# Patient Record
Sex: FEMALE | Race: WHITE | HISPANIC OR LATINO
[De-identification: names, ages, dates, MRNs, and addresses within clinical notes are randomized per-mention and may not be internally consistent; named-entity substitution may affect disease eponyms.]

---

## 2017-02-08 ENCOUNTER — RX ONLY (OUTPATIENT)
Age: 32
Setting detail: RX ONLY
End: 2017-02-08

## 2017-02-08 PROBLEM — D22.5 MELANOCYTIC NEVI OF TRUNK: Status: ACTIVE | Noted: 2017-02-08

## 2017-12-29 ENCOUNTER — GYNECOLOGY VISIT (OUTPATIENT)
Dept: OBGYN | Facility: CLINIC | Age: 32
End: 2017-12-29
Payer: OTHER GOVERNMENT

## 2017-12-29 VITALS
BODY MASS INDEX: 44.37 KG/M2 | HEIGHT: 61 IN | SYSTOLIC BLOOD PRESSURE: 122 MMHG | WEIGHT: 235 LBS | DIASTOLIC BLOOD PRESSURE: 82 MMHG

## 2017-12-29 DIAGNOSIS — D25.1 INTRAMURAL LEIOMYOMA OF UTERUS: ICD-10-CM

## 2017-12-29 DIAGNOSIS — N92.0 MENORRHAGIA WITH REGULAR CYCLE: ICD-10-CM

## 2017-12-29 PROCEDURE — 99214 OFFICE O/P EST MOD 30 MIN: CPT | Performed by: OBSTETRICS & GYNECOLOGY

## 2017-12-29 NOTE — PROGRESS NOTES
32 y.o. , Prev C/s x1, for post dates , CPD . Patient as well with massive uterine fibroid . Patient's op note states 25 cm . And delivery was .   Patient now returns and concerned about not be able to concieve. Had follow up scan  , told fibroid was > 17cm Fundal .describes normal monthly cycles , heavy , with clots , although less now , than prior . Patient wants advice on tumor and pregnancy .   Past Medical History:   Diagnosis Date   • Pneumonia     Scar tissue remains, activity induced breathing problems, uses a daily and rescue, haven't used for over a year     Patient Active Problem List    Diagnosis Date Noted   • Intramural leiomyoma of uterus 2015     Social History     Social History   • Marital status:      Spouse name: N/A   • Number of children: N/A   • Years of education: N/A     Occupational History   • Not on file.     Social History Main Topics   • Smoking status: Former Smoker     Packs/day: 0.12     Types: Cigarettes   • Smokeless tobacco: Former User     Quit date: 2002   • Alcohol use No   • Drug use: No   • Sexual activity: Not on file     Other Topics Concern   • Not on file     Social History Narrative   • No narrative on file        Past Surgical History:   Procedure Laterality Date   • PRIMARY C SECTION  2015    Procedure: PRIMARY C SECTION;  Surgeon: Lorena Flores M.D.;  Location: [facility];  Service:    • OTHER      Surgery for Right arm fracture at 18y/o     Obstetric History       T0      L1     SAB1   TAB0   Ectopic0   Molar0   Multiple0   Live Births1      Current Outpatient Prescriptions on File Prior to Visit   Medication Sig Dispense Refill   • oxycodone-acetaminophen (PERCOCET) 5-325 MG Tab Take 1-2 Tabs by mouth every four hours as needed for Moderate Pain ((Pain Scale 4-6)). 30 Tab 0   • ibuprofen (MOTRIN) 600 MG Tab Take 1 Tab by mouth every 6 hours as needed (Cramping). 30 Tab 3   • ferrous sulfate 325  "(65 FE) MG tablet Take 1 Tab by mouth 3 times a day, with meals. 90 Tab 3   • docusate sodium 100 MG Cap Take 100 mg by mouth 2 times a day as needed for Constipation. 60 Cap 3   • Prenatal Vit w/Wt-Nsckdnyjp-QK (PNV PO) Take 1 Tab by mouth every day.     • amoxicillin (AMOXIL) 875 MG tablet Take 1 Tab by mouth 2 times a day. 20 Tab 0   • DPH-Lido-AlHydr-MgHydr-Simeth (FIRST-MOUTHWASH BLM) SUSP 10ml gargle and spit every 3 hours as needed. 240 mL 0   • therapeutic multivitamin-minerals (THERAGRAN-M) TABS Take 1 Tab by mouth every day.       No current facility-administered medications on file prior to visit.        Allergies   Allergen Reactions   • Erythromycin      U/s Reviewed :   /25/2016 3:22 PM    HISTORY/REASON FOR EXAM:  Follow-up fibroid      TECHNIQUE/EXAM DESCRIPTION:  Transvaginal pelvic ultrasound.    COMPARISON:   Pelvic ultrasound 11/7/2014    FINDINGS:    Imaging assessment is extremely limited due to patient body habitus.      Both transabdominal and transvaginal scanning was performed to optimally visualize the pelvis.    The uterus measures 15.38 cm x 20.46 cm x 16.93 cm.      Protruding off the uterine fundus there is an approximate 17 cm mass. This finding is consistent with a fibroid. It is considerably larger.      The endometrial echo complex measures 0.83 cm.      The right ovary is not visualized.    The left ovary measures 1.92 cm x 1.40 cm x 2.59 cm.        There is no free fluid seen.   Impression       1.  Large mass protruding off the uterine fundus measuring approximately 17 cm and considerably larger. This finding is most consistent with a fibroid.    2.  Left ovary grossly normal    3.  Right ovary not visualized    4.  Imaging assessment is extremely limited due to patient body habitus     Encounter Vitals  Standard Vitals  Vitals  Blood Pressure: 122/82  Height: 154.9 cm (5' 1\")  Weight: 106.6 kg (235 lb)  Encounter Vitals  Blood Pressure: 122/82  Weight: 106.6 kg (235 " "lb)  Height: 154.9 cm (5' 1\")  BMI (Calculated): 44.4  LMP Date: 12/01/17  Pregnant?: No  Breastfeeding Status: No  Pulmonary-Specific Vitals     Durable Medical Equipment-Specific Vitals           Ass:   Massive uterine fibroid : fundal   Prev C/S   Desires Fertility   BMI : 44.4  Spent 30 minutes minutes with the patient ; Face to Face, with >100% of this time spent in counseling and coordination of care, surrounding the above mentioned issues as well as: myomectomy , effects on fertility . Explained that would not recommend pregnancy , until fibroid better evaluated , and most likely removed .     P.   Recommend Pelvic MRI   Luteal P-4 levels   RTC after above   "

## 2017-12-31 DIAGNOSIS — N91.4 SECONDARY OLIGOMENORRHEA: ICD-10-CM

## 2018-01-03 LAB — PROGEST SERPL-MCNC: 7.5 NG/ML

## 2018-01-04 ENCOUNTER — HOSPITAL ENCOUNTER (OUTPATIENT)
Dept: RADIOLOGY | Facility: MEDICAL CENTER | Age: 33
End: 2018-01-04
Attending: OBSTETRICS & GYNECOLOGY
Payer: OTHER GOVERNMENT

## 2018-01-04 PROCEDURE — 72197 MRI PELVIS W/O & W/DYE: CPT

## 2018-01-04 PROCEDURE — A9579 GAD-BASE MR CONTRAST NOS,1ML: HCPCS | Performed by: OBSTETRICS & GYNECOLOGY

## 2018-01-04 PROCEDURE — 700117 HCHG RX CONTRAST REV CODE 255: Performed by: OBSTETRICS & GYNECOLOGY

## 2018-01-04 RX ADMIN — GADODIAMIDE 20 ML: 287 INJECTION INTRAVENOUS at 16:51

## 2018-02-15 ENCOUNTER — GYNECOLOGY VISIT (OUTPATIENT)
Dept: OBGYN | Facility: CLINIC | Age: 33
End: 2018-02-15
Payer: OTHER GOVERNMENT

## 2018-02-15 VITALS — HEIGHT: 61 IN | DIASTOLIC BLOOD PRESSURE: 72 MMHG | SYSTOLIC BLOOD PRESSURE: 120 MMHG

## 2018-02-15 DIAGNOSIS — D25.0 INTRAMURAL, SUBMUCOUS, AND SUBSEROUS LEIOMYOMA OF UTERUS: ICD-10-CM

## 2018-02-15 DIAGNOSIS — D25.1 INTRAMURAL, SUBMUCOUS, AND SUBSEROUS LEIOMYOMA OF UTERUS: ICD-10-CM

## 2018-02-15 DIAGNOSIS — D25.2 INTRAMURAL, SUBMUCOUS, AND SUBSEROUS LEIOMYOMA OF UTERUS: ICD-10-CM

## 2018-02-15 PROCEDURE — 99215 OFFICE O/P EST HI 40 MIN: CPT | Performed by: OBSTETRICS & GYNECOLOGY

## 2018-02-15 NOTE — PROGRESS NOTES
Extensive discussion with patient and SO , regarding results of MRI and luteal phase  Progesterone   Results for MARIO MCDERMOTT (MRN [id number]) as of 2/15/2018 10:55   Ref. Range 2018 09:59 2018 16:49   Progesterone Latest Units: ng/mL 7.5      Narrative       2018 4:49 PM    HISTORY/REASON FOR EXAM:  Fundal fibroid. Preoperative evaluation for myomectomy      TECHNIQUE/EXAM DESCRIPTION:  MRI of the pelvis without and with contrast.    The study was performed on a Lily 1.5 Natalie MRI scanner.    T1 axial, T1 coronal, T2 fast spin-echo fat-suppressed axial, and T2 fast spin-echo fat-suppressed coronal images were obtained of the pelvis. Postcontrast fat-suppressed intravenous gadolinium enhanced sequences in the axial and coronal planes were then   performed.    20 mL Omniscan contrast was administered intravenously.    COMPARISON: Ultrasound 2016    FINDINGS:  The uterus measures approximately 24.4 x 11.5 x 17.1 cm. A low T1, low T2 signal mass in the uterine fundus measures approximately 16.1 x 10.3 x 15.7 cm without internal enhancement. The mass distorts the endometrial stripe which measures approximately 6   mm in thickness. Accurate measurement of the junctional zone is difficult with distortion of the endometrial stripe. The junctional zone does not appear significantly thickened. No additional uterine masses are identified. Susceptibility artifact in the   anterior lower uterine segment/upper cervix may be related to a prior . There are a couple of small adhesions extending from the lower uterine segment to bowel loops in the pelvis.    The left ovary measures approximately 3.7 x 2.2 x 3.4 cm and contains multiple follicles. The left ovary is located posterior to the lower uterine segment/upper cervix in the mid pelvis.    The right ovary measures approximately 4.2 x 1.5 x 2.4 cm with multiple follicles. The right ovary is located to the right of the  uterus.    There is a small amount of free fluid in the pelvis, nonspecific.    No adenopathy is identified.   Impression       1.  Large leiomyoma in the uterine fundus without internal enhancement, consistent with hyaline degeneration.    2.  Small amount of nonspecific free pelvic fluid.   Reading Provider Reading Date   Aranza Bush M.D. 2018   Signing Provider Signing Date Signing Time   Aranza Bush M.D. 2018  9:18 AM   Lab Information     Lab   [facility]              Last Resulted Time   Fri 2018  9:21 AM   Detailed Information     Priority and Order Details           Collection Information     Resulting Agency: [facility]      Order-Level Documents:     There are no order-level documents.   Order Detail Report     MR-PELVIS-WITH & W/O AND SEQUENCES (Order #[id number]) on 17       Past Medical History:   Diagnosis Date   • Pneumonia     Scar tissue remains, activity induced breathing problems, uses a daily and rescue, haven't used for over a year     Past Surgical History:   Procedure Laterality Date   • PRIMARY C SECTION  2015    Procedure: PRIMARY C SECTION;  Surgeon: Lorena Flores M.D.;  Location: [facility];  Service:    • OTHER      Surgery for Right arm fracture at 18y/o       Obstetric History       T0      L1     SAB1   TAB0   Ectopic0   Molar0   Multiple0   Live Births1         Current Outpatient Prescriptions on File Prior to Visit   Medication Sig Dispense Refill   • oxycodone-acetaminophen (PERCOCET) 5-325 MG Tab Take 1-2 Tabs by mouth every four hours as needed for Moderate Pain ((Pain Scale 4-6)). 30 Tab 0   • ibuprofen (MOTRIN) 600 MG Tab Take 1 Tab by mouth every 6 hours as needed (Cramping). 30 Tab 3   • ferrous sulfate 325 (65 FE) MG tablet Take 1 Tab by mouth 3 times a day, with meals. 90 Tab 3   • docusate sodium 100 MG Cap Take 100 mg by mouth 2 times a day as needed for Constipation. 60 Cap 3   • Prenatal Vit  "w/Xu-Iaeuwwzum-XC (PNV PO) Take 1 Tab by mouth every day.     • amoxicillin (AMOXIL) 875 MG tablet Take 1 Tab by mouth 2 times a day. 20 Tab 0   • DPH-Lido-AlHydr-MgHydr-Simeth (FIRST-MOUTHWASH BLM) SUSP 10ml gargle and spit every 3 hours as needed. 240 mL 0   • therapeutic multivitamin-minerals (THERAGRAN-M) TABS Take 1 Tab by mouth every day.       No current facility-administered medications on file prior to visit.          Allergies   Allergen Reactions   • Erythromycin          Encounter Vitals  Standard Vitals  Vitals  Blood Pressure: 120/72  Height: 154.9 cm (5' 1\")  Encounter Vitals  Blood Pressure: 120/72  BP Location: Right, Upper Arm  Patient BP Position: Sitting  Height: 154.9 cm (5' 1\")  Pregnant?: No  Breastfeeding Status: No  Pulmonary-Specific Vitals     Durable Medical Equipment-Specific Vitals             No LMP recorded.    2/6/2018     ass  Massive single uterine fibroid   Prev C/S   Desires pregnancy   Probable PCOS   Obesity     Spent 45 minutes minutes with the patient ; Face to Face, with >50% of this time spent in counseling and coordination of care, surrounding the above mentioned issues as well as:surgery , with drawings of details of technique, results of progesterone and effects on future pregnancy   Lupron and pre operative management   P.   Auth for Lupron , then auth for surgery   Will also need Aygestin 2.5 mg daily Add back     "

## 2018-03-28 ENCOUNTER — TELEPHONE (OUTPATIENT)
Dept: OBGYN | Facility: CLINIC | Age: 33
End: 2018-03-28

## 2018-03-28 NOTE — TELEPHONE ENCOUNTER
Patient called back and just wanted some reassurance about the lupron shot being ok before surgery and how it will affect her during surgery. I explained to the patient and she felt much better after we hung up.

## 2018-03-28 NOTE — TELEPHONE ENCOUNTER
Patient called and was wondering a little bit more about the surgery like what it is and it's process. I called the patient and left a message for the patient to call back. Pt needs to schedule an appointment to talk to the doctor about her questions

## 2018-04-03 ENCOUNTER — NON-PROVIDER VISIT (OUTPATIENT)
Dept: OBGYN | Facility: CLINIC | Age: 33
End: 2018-04-03
Payer: OTHER GOVERNMENT

## 2018-04-03 DIAGNOSIS — Z79.818 PROPHYLACTIC USE OF LEUPROLIDE ACETATE (LUPRON): ICD-10-CM

## 2018-04-03 DIAGNOSIS — D25.9 UTERINE LEIOMYOMA, UNSPECIFIED LOCATION: ICD-10-CM

## 2018-04-03 LAB
INT CON NEG: NEGATIVE
INT CON POS: POSITIVE
POC URINE PREGNANCY TEST: NEGATIVE

## 2018-04-03 PROCEDURE — 96372 THER/PROPH/DIAG INJ SC/IM: CPT | Performed by: OBSTETRICS & GYNECOLOGY

## 2018-04-03 PROCEDURE — 81025 URINE PREGNANCY TEST: CPT | Performed by: OBSTETRICS & GYNECOLOGY

## 2018-04-03 NOTE — NON-PROVIDER
DEPOT LUPRON    NDC: 9846-4870-98  LOT#: [id number]  Expiration Date: 3/28/20    Dose: 3.75 MG  Site: Right Upper Outer Quadrant Gluteal    Patient educated on use and side effects of medication. Name and  verified prior to injection. Pt tolerated? Yes     Verified by CL    Administered by Vicki Milan at 11:06 AM.    Patient Provided Medication: yes - Delivered by specialty pharmacy

## 2018-05-01 ENCOUNTER — TELEPHONE (OUTPATIENT)
Dept: OBGYN | Facility: CLINIC | Age: 33
End: 2018-05-01

## 2018-05-01 NOTE — TELEPHONE ENCOUNTER
LMOM for patient to call [facility] mail order pharmacy to give consent for next day air depot lupron inj to be delivered thrisday for appt. Pt also spoke to Letha JAUREGUI

## 2018-05-03 ENCOUNTER — NON-PROVIDER VISIT (OUTPATIENT)
Dept: OBGYN | Facility: CLINIC | Age: 33
End: 2018-05-03
Payer: OTHER GOVERNMENT

## 2018-05-03 DIAGNOSIS — D25.1 INTRAMURAL, SUBMUCOUS, AND SUBSEROUS LEIOMYOMA OF UTERUS: ICD-10-CM

## 2018-05-03 DIAGNOSIS — D25.0 INTRAMURAL, SUBMUCOUS, AND SUBSEROUS LEIOMYOMA OF UTERUS: ICD-10-CM

## 2018-05-03 DIAGNOSIS — D25.2 INTRAMURAL, SUBMUCOUS, AND SUBSEROUS LEIOMYOMA OF UTERUS: ICD-10-CM

## 2018-05-03 LAB
INT CON NEG: NEGATIVE
INT CON POS: POSITIVE
POC URINE PREGNANCY TEST: NEGATIVE

## 2018-05-03 PROCEDURE — 96372 THER/PROPH/DIAG INJ SC/IM: CPT | Performed by: OBSTETRICS & GYNECOLOGY

## 2018-05-03 PROCEDURE — 81025 URINE PREGNANCY TEST: CPT | Performed by: OBSTETRICS & GYNECOLOGY

## 2018-05-03 NOTE — NON-PROVIDER
Depot lupron    NDC: 8804-6108-26  LOT#: [id number]  Expiration Date: 3/29/2020    Dose: 3.75 mg  Site: Left Upper Outer Quadrant Gluteal    Patient educated on use and side effects of medication. Name and  verified prior to injection. Pt tolerated? Yes     Verified by ss    Administered by Vicki Milan at 1:37 PM.    Patient Provided Medication: yes - Delivered by specialty pharmacy    Next inj x1 month

## 2018-06-06 ENCOUNTER — NON-PROVIDER VISIT (OUTPATIENT)
Dept: OBGYN | Facility: CLINIC | Age: 33
End: 2018-06-06
Payer: OTHER GOVERNMENT

## 2018-06-06 NOTE — NON-PROVIDER
NDC: 1306-3368-04  LOT#: [id number]  Expiration Date: 2020    Dose: 3.75mg  Site: Right Upper Outer Quadrant Gluteal    Patient educated on use and side effects of medication. Name and  verified prior to injection. Pt tolerated? yes     Verified by Lisy Cho    Administered by Miguel Ozuna at 2:38 PM.    Patient Provided Medication: yes       Pt states she was be scheduled for surgery to remove fibroid after 3 months of lupron. Advised we will discuss with  and return call.

## 2018-06-07 ENCOUNTER — TELEPHONE (OUTPATIENT)
Dept: OBGYN | Facility: CLINIC | Age: 33
End: 2018-06-07

## 2018-06-07 NOTE — TELEPHONE ENCOUNTER
Pt needs follow up appt with Dr Barragan but no appts available. Per Sandra, she will call patient as soon as she gets an answer from Dr Barragan on where to fit patient in. Pt also needs MRI and another Depot Lupron before surgery. Pt verbalized understanding and had no further question.

## 2018-06-12 DIAGNOSIS — D25.1 INTRAMURAL, SUBMUCOUS, AND SUBSEROUS LEIOMYOMA OF UTERUS: ICD-10-CM

## 2018-06-12 DIAGNOSIS — D25.0 INTRAMURAL, SUBMUCOUS, AND SUBSEROUS LEIOMYOMA OF UTERUS: ICD-10-CM

## 2018-06-12 DIAGNOSIS — D25.2 INTRAMURAL, SUBMUCOUS, AND SUBSEROUS LEIOMYOMA OF UTERUS: ICD-10-CM

## 2018-06-13 ENCOUNTER — TELEPHONE (OUTPATIENT)
Dept: OBGYN | Facility: CLINIC | Age: 33
End: 2018-06-13

## 2018-06-13 NOTE — TELEPHONE ENCOUNTER
----- Message from Sandra Angela sent at 6/13/2018  9:18 AM PDT -----  Regarding: eliseo  Contact: [phone]  Patient is trying to see if we are able to get her an MRI order?Thank you.

## 2018-06-27 ENCOUNTER — HOSPITAL ENCOUNTER (OUTPATIENT)
Dept: RADIOLOGY | Facility: MEDICAL CENTER | Age: 33
End: 2018-06-27
Attending: OBSTETRICS & GYNECOLOGY
Payer: OTHER GOVERNMENT

## 2018-06-27 PROCEDURE — 72197 MRI PELVIS W/O & W/DYE: CPT

## 2018-06-27 PROCEDURE — A9579 GAD-BASE MR CONTRAST NOS,1ML: HCPCS | Performed by: OBSTETRICS & GYNECOLOGY

## 2018-06-27 PROCEDURE — 700117 HCHG RX CONTRAST REV CODE 255: Performed by: OBSTETRICS & GYNECOLOGY

## 2018-06-27 RX ADMIN — GADODIAMIDE 20 ML: 287 INJECTION INTRAVENOUS at 09:10

## 2018-06-29 ENCOUNTER — TELEPHONE (OUTPATIENT)
Dept: OBGYN | Facility: CLINIC | Age: 33
End: 2018-06-29

## 2018-06-30 NOTE — TELEPHONE ENCOUNTER
----- Message from Rey Barragan M.D. sent at 6/28/2018  9:47 AM PDT -----  Slight decrease in size , for large , but apparent isolated myoma . Prep for surgery

## 2018-07-02 ENCOUNTER — TELEPHONE (OUTPATIENT)
Dept: OBGYN | Facility: CLINIC | Age: 33
End: 2018-07-02

## 2018-07-02 NOTE — TELEPHONE ENCOUNTER
Pt wanted to have pre-op appt with Dr Barragan on 7/6/18 but per Surgery Scheduler ( Yesy) she will call patient back with appt time and date.

## 2018-07-06 ENCOUNTER — NON-PROVIDER VISIT (OUTPATIENT)
Dept: OBGYN | Facility: CLINIC | Age: 33
End: 2018-07-06
Payer: OTHER GOVERNMENT

## 2018-07-06 DIAGNOSIS — D25.1 INTRAMURAL LEIOMYOMA OF UTERUS: ICD-10-CM

## 2018-07-06 PROCEDURE — 96372 THER/PROPH/DIAG INJ SC/IM: CPT | Performed by: OBSTETRICS & GYNECOLOGY

## 2018-07-06 NOTE — NON-PROVIDER
LUPRON GIVEN  NDC: 3531-5714-02  LOT#: [id number]  Expiration Date: 2020    Dose: 3.75MG  Site: Right Upper Outer Quadrant Gluteal    Patient educated on use and side effects of medication. Name and  verified prior to injection. Pt tolerated? YES     Verified by TM    Administered by Lisy Cho at 2:01 PM.    Patient Provided Medication: no

## 2018-08-17 ENCOUNTER — GYNECOLOGY VISIT (OUTPATIENT)
Dept: OBGYN | Facility: CLINIC | Age: 33
End: 2018-08-17
Payer: OTHER GOVERNMENT

## 2018-08-17 VITALS
DIASTOLIC BLOOD PRESSURE: 74 MMHG | BODY MASS INDEX: 43.99 KG/M2 | WEIGHT: 233 LBS | SYSTOLIC BLOOD PRESSURE: 128 MMHG | HEIGHT: 61 IN

## 2018-08-17 DIAGNOSIS — D25.1 INTRAMURAL LEIOMYOMA OF UTERUS: ICD-10-CM

## 2018-08-17 DIAGNOSIS — N92.0 MENORRHAGIA WITH REGULAR CYCLE: ICD-10-CM

## 2018-08-17 NOTE — PROGRESS NOTES
Pre op exam performed for Transabdominal Myomectomy   S/P 3 courses Lupron   No bleeding x 3 months , also taking Add-back Aygestin   Patient here with SO  All questions answered   Discussion on risks and complications , including risk of emergent hysterectomy , secondary to bleeding , etc , during myomectomy . Explained mid vertical incision needed   Complete discussion regarding the proposed procedure was conducted both today and throughout the entire reginald-operative period. The procedure: myomectomy  Was specifically addressed. There is increased risk from  any surgical procedure related to  Anesthesia, increased risk of infection, both abdominal and wound infections as well as heavy bleeding, both during surgery, or delayed bleeding. These were discussed as well.     Specific to Abdominal surgery ( open), patient is made aware of risk of injury to the bowel, bladder, nerves and the ureter ( urine conduit). Complications to these organs are rare, but do occur and indeed it was discussed that the specific pathology of the patient that necessitated surgery may make the risk greater. Patient additionally is aware of the risk of subsequent adhesions or small bowel obstruction from open abdominal surgery.                                                                                                                ________________  Specific to Hysterectomy, patient is aware that although alternative methods exist for uterine preservation, that both patient and surgeon, agree that the benefits to hysterectomy outweigh the risks and that alternative methods are not indicated or beneficial for her specific pathology ( problem) Patient is also aware that removal of the ovaries may if not already in menopause, speed the onset of menopause, and that specific discussions regarding the risk/benefit of ovarian preservation vs removal and the use of hormone replacement have been discussed and all questions answered.                                                                                                                ________________  Specific to  Section, patient is made aware of the increase risk of infection, hemorrhage, hysterectomy, post operative adhesions and the risk of uterine rupture with subsequent pregnancies. All methods currently accepted will be used to decrease the risk of these complications, but the risk can not be erased. Patient is aware that despite exceptions that repeat  Sections increase the above risks dramatically, and as a consequence may limit the number of children recommended.                                                                                                             _________________  Specific to Trial of Labor After  ( TOLAC), patient is made aware that current recommendations regarding risk , require the patient and doctor to determine if she is a candidate. Not all patients qualify for TOLAC and thus require Respeat  Section. Cervical Ripening and /or induction of labor are not recommended as they are associated with increased risk of uterine rupture.  Patient is aware of the risk of uterine rupture for ideal candidates is between 0.1% and 1% for Previous  (x 1), and that despite every effort being made, that significant morbidity may occur to both mother and child , including death or disability. Patient is aware that there is increased risk of infection and hemorrhage for those patients who fail a TOLAC and require a repeat  Section, greater than that risk for primary elective Repeat  Sections.                                                                                                             __________________

## 2018-08-20 DIAGNOSIS — Z01.812 PRE-OPERATIVE LABORATORY EXAMINATION: ICD-10-CM

## 2018-08-20 LAB
ABO GROUP BLD: NORMAL
ANION GAP SERPL CALC-SCNC: 11 MMOL/L (ref 0–11.9)
B-HCG SERPL-ACNC: <0.6 MIU/ML (ref 0–5)
BASOPHILS # BLD AUTO: 0.4 % (ref 0–1.8)
BASOPHILS # BLD: 0.04 K/UL (ref 0–0.12)
BLD GP AB SCN SERPL QL: NORMAL
BUN SERPL-MCNC: 12 MG/DL (ref 8–22)
CALCIUM SERPL-MCNC: 9.8 MG/DL (ref 8.5–10.5)
CHLORIDE SERPL-SCNC: 107 MMOL/L (ref 96–112)
CO2 SERPL-SCNC: 22 MMOL/L (ref 20–33)
CREAT SERPL-MCNC: 0.99 MG/DL (ref 0.5–1.4)
EOSINOPHIL # BLD AUTO: 0.07 K/UL (ref 0–0.51)
EOSINOPHIL NFR BLD: 0.6 % (ref 0–6.9)
ERYTHROCYTE [DISTWIDTH] IN BLOOD BY AUTOMATED COUNT: 49.2 FL (ref 35.9–50)
GLUCOSE SERPL-MCNC: 94 MG/DL (ref 65–99)
HCT VFR BLD AUTO: 44.5 % (ref 37–47)
HGB BLD-MCNC: 14.9 G/DL (ref 12–16)
IMM GRANULOCYTES # BLD AUTO: 0.07 K/UL (ref 0–0.11)
IMM GRANULOCYTES NFR BLD AUTO: 0.6 % (ref 0–0.9)
LYMPHOCYTES # BLD AUTO: 1.04 K/UL (ref 1–4.8)
LYMPHOCYTES NFR BLD: 9.1 % (ref 22–41)
MCH RBC QN AUTO: 29.6 PG (ref 27–33)
MCHC RBC AUTO-ENTMCNC: 33.5 G/DL (ref 33.6–35)
MCV RBC AUTO: 88.5 FL (ref 81.4–97.8)
MONOCYTES # BLD AUTO: 0.57 K/UL (ref 0–0.85)
MONOCYTES NFR BLD AUTO: 5 % (ref 0–13.4)
NEUTROPHILS # BLD AUTO: 9.61 K/UL (ref 2–7.15)
NEUTROPHILS NFR BLD: 84.3 % (ref 44–72)
NRBC # BLD AUTO: 0 K/UL
NRBC BLD-RTO: 0 /100 WBC
PLATELET # BLD AUTO: 274 K/UL (ref 164–446)
PMV BLD AUTO: 12.9 FL (ref 9–12.9)
POTASSIUM SERPL-SCNC: 3.9 MMOL/L (ref 3.6–5.5)
RBC # BLD AUTO: 5.03 M/UL (ref 4.2–5.4)
RH BLD: NORMAL
SODIUM SERPL-SCNC: 140 MMOL/L (ref 135–145)
WBC # BLD AUTO: 11.4 K/UL (ref 4.8–10.8)

## 2018-08-20 PROCEDURE — 36415 COLL VENOUS BLD VENIPUNCTURE: CPT

## 2018-08-20 PROCEDURE — 86901 BLOOD TYPING SEROLOGIC RH(D): CPT

## 2018-08-20 PROCEDURE — 86850 RBC ANTIBODY SCREEN: CPT

## 2018-08-20 PROCEDURE — 80048 BASIC METABOLIC PNL TOTAL CA: CPT

## 2018-08-20 PROCEDURE — 85025 COMPLETE CBC W/AUTO DIFF WBC: CPT

## 2018-08-20 PROCEDURE — 84702 CHORIONIC GONADOTROPIN TEST: CPT

## 2018-08-20 PROCEDURE — 86900 BLOOD TYPING SEROLOGIC ABO: CPT

## 2018-08-20 RX ORDER — ACETAMINOPHEN 500 MG
500-1000 TABLET ORAL PRN
COMMUNITY

## 2018-08-20 RX ORDER — MELOXICAM 15 MG/1
15 TABLET ORAL DAILY
Status: ON HOLD | COMMUNITY
End: 2018-09-01

## 2018-08-20 RX ORDER — M-VIT,TX,IRON,MINS/CALC/FOLIC 27MG-0.4MG
1 TABLET ORAL DAILY
COMMUNITY
End: 2018-08-20

## 2018-08-20 RX ORDER — PHENTERMINE HYDROCHLORIDE 37.5 MG/1
37.5 TABLET ORAL
Status: ON HOLD | COMMUNITY
End: 2018-09-01

## 2018-08-20 RX ORDER — CHOLECALCIFEROL (VITAMIN D3) 125 MCG
2 CAPSULE ORAL
COMMUNITY

## 2018-08-20 RX ORDER — MONTELUKAST SODIUM 10 MG/1
10 TABLET ORAL DAILY
COMMUNITY

## 2018-08-30 NOTE — H&P
History and Physical    Radha Joshi is a 32 y.o. female  -Para:     Admitted for:   Transabdominal myomectomy , Chromopertubation  Admitted to  [facility]  Patient received prenatal care: [facility]    HPI: Patient is admitted with the above mentioned Chief Complaint and States , had delivery via C/S  , secondary to CPD. With noted large uterine fibroid . PAtient since , has noted menorrhagia , with dysmenorrhea, and presented to office to discuss Fibroid, bleeding and deires for future pregnancy .   Patient found to ovulatory .   MRI of pelvis : revealed , large (>17 cm) , single intramural fibroid , encroaching on endometrial stripe.   Patient has been counseled and desires interval myomectomy , prior to subsequent pregnancy .  Patient as well has received 3 months Lupron Rx , with aygestin add-back     Patient denies fever, chills, nausea, vomiting , headache, visual disturbance, or dysuria  No LMP recorded. Patient has had an injection.   Estimated Date of Delivery: None noted.  Final DAR: Not found.    Patient Active Problem List    Diagnosis Date Noted   • Intramural leiomyoma of uterus 2015       Admitting DX: Intramural leiomyoma of uterus [D25.1]  Menorrhagia   Prev C/S  Desires Fertility     History:   has a past medical history of Asthma and Pneumonia ().     has a past surgical history that includes other and primary c section (2015).    OB History    Para Term  AB Living   2 0 0   1 1   SAB TAB Ectopic Molar Multiple Live Births   1         1      # Outcome Date GA Lbr Cooper/2nd Weight Sex Delivery Anes PTL Lv   2  11/09/15    F CS-LTranv Spinal  ZELDA      Complications: Failure to Progress in First Stage,Fetal Intolerance   1 SAB                 Social History     Social History   • Marital status:      Spouse name: N/A   • Number of children: N/A   • Years of education: N/A     Occupational History   • Not on  "file.     Social History Main Topics   • Smoking status: Former Smoker     Packs/day: 0.12     Types: Cigarettes     Quit date: 2006   • Smokeless tobacco: Former User     Quit date: 1/1/2002   • Alcohol use Yes      Comment: 2/mo   • Drug use: No   • Sexual activity: Not on file     Other Topics Concern   • Not on file     Social History Narrative   • No narrative on file       Medications:  No current facility-administered medications on file prior to encounter.      Current Outpatient Prescriptions on File Prior to Encounter   Medication Sig Dispense Refill   • norethindrone (AYGESTIN) 5 MG tablet Take 1 Tab by mouth every day. 30 Tab 6   • ferrous sulfate 325 (65 FE) MG tablet Take 1 Tab by mouth 3 times a day, with meals. 90 Tab 3       Allergies:  Erythromycin    ROS:   Neuro: negative for headache, seizures, weakness, paralysis    Cardiovascular: negative for chest pain, dyspnea, palpitations, orthopnea, lower extremity edema  Gastro intestinal: positive for abdominal pain, increasing abdominal girth   Genitourinary: positive for menorrhagia             Physical Exam:  Ht 1.549 m (5' 1\")   Wt 105.4 kg (232 lb 5.8 oz)   BMI 43.90 kg/m²   BP : 122/82  Constitutional: healthy-appearing, Well-developed, well-nourished, moderately obese,  female, in no acute distress  No JVD: while supine, at 30 degrees  HEENT: PERRLA, EOMI and Sclera clear, anicteric  Breast Exam: Inspection negative. No nipple discharge or bleeding. No masses or nodularity palpable, negative findings: normal in size and symmetry, normal contour with no evidence of flattening or dimpling, skin normal, nipples everted without rashes or discharge, no palpable axillary lymphadenopathy  Cardio: regular rate and rhythm, S1, S2 normal, no murmur, click, rub or gallop  Lung: unlabored respirations, no intercostal retractions or accessory muscle use, clear to auscultation without rales or wheezes  Abdomen: rebound tenderness absent, guarding " present, bowel sounds normal, previous incisional scars pfannenstiel , mass effect to umbilicus   Extremity: extremities, peripheral pulses and reflexes normal  Pelvis:     external genitalia normal, normal Bartholin's glands, urethra, Ronda's glands, no vulvar lesions, no cervical lesions        positive findings: uterine enlargement, uterine fibroids palpable, uterus mobile , with cephalad and lateral movement, appears , single large mass +1-2 tender     Labs:     Imaging Result Status     Status: Final result (Exam End: 6/27/2018  9:22 AM)   Imaging Previous Results     Open Hard Copy Result Report (Order #[id number] - MR-PELVIS-WITH & W/O AND SEQUENCES)   Narrative       6/27/2018 8:25 AM    HISTORY/REASON FOR EXAM:  History of uterine leiomyoma. History of Lupron x3. Assess for Lupron affects.      TECHNIQUE/EXAM DESCRIPTION:  MRI of the pelvis without and with contrast.    The study was performed on a Ripl Signa 1.5 Natalie MRI scanner.    T1 axial, T1 coronal, T2 fast spin-echo fat-suppressed axial, and T2 fast spin-echo fat-suppressed coronal images were obtained of the pelvis. Postcontrast fat-suppressed intravenous gadolinium enhanced sequences in the axial and coronal planes were then   performed.    20 mL Omniscan contrast was administered intravenously.    COMPARISON: Previous MRI 1/4/2018    FINDINGS:  The large uterine leiomyoma occupying the majority of the uterine body measures approximately 15.4 x 9.8 x 15.9 cm, slightly decreased over the prior study. There is no internal enhancement.    Both ovaries are normal in appearance with small follicles. They are normal in size.    There is no free fluid.    There is no pelvic adenopathy. Her graft Limited imaging of the lower abdomen is unremarkable.   Impression       1.  There has been very slight interval decrease in size of the large uterine leiomyoma without internal enhancement consistent with hyaline degeneration.   Result Notes     Notes recorded by  Rey Barragan M.D. on 6/28/2018 at 9:47 AM PDT  Slight decrease in size , for large , but apparent isolated myoma . Prep for surgery          Reading Provider Reading Date   Padmini Segovia M.D. Jun 27, 2018   Signing Provider Signing Date Signing Time   Padmini Segovia M.D. Jun 27, 2018  1:43 PM   Order Detail Report     MR-PELVIS-WITH & W/O AND SEQUENCES (Order #[id number]) on 6/12/18   Order-Level Documents:     There are no order-level documents.   Encounter-Level Documents:   Results for MARIO MCDERMOTT (MRN [id number]) as of 8/30/2018 13:18   Ref. Range 8/20/2018 12:03   WBC Latest Ref Range: 4.8 - 10.8 K/uL 11.4 (H)   RBC Latest Ref Range: 4.20 - 5.40 M/uL 5.03   Hemoglobin Latest Ref Range: 12.0 - 16.0 g/dL 14.9   Hematocrit Latest Ref Range: 37.0 - 47.0 % 44.5   MCV Latest Ref Range: 81.4 - 97.8 fL 88.5   MCH Latest Ref Range: 27.0 - 33.0 pg 29.6   MCHC Latest Ref Range: 33.6 - 35.0 g/dL 33.5 (L)   RDW Latest Ref Range: 35.9 - 50.0 fL 49.2   Platelet Count Latest Ref Range: 164 - 446 K/uL 274   MPV Latest Ref Range: 9.0 - 12.9 fL 12.9                  Assessment:  Menorrhagia   PRev C/s   Desires Fertility     Patient Active Problem List    Diagnosis Date Noted   • Intramural leiomyoma of uterus 12/22/2015       Plan:    Transabdominal Myomectomy , chromopertubation   Antibiotics: Pre op     Rey Barragan M.D.

## 2018-08-31 ENCOUNTER — HOSPITAL ENCOUNTER (INPATIENT)
Facility: MEDICAL CENTER | Age: 33
LOS: 1 days | DRG: 743 | End: 2018-09-01
Attending: OBSTETRICS & GYNECOLOGY | Admitting: OBSTETRICS & GYNECOLOGY
Payer: OTHER GOVERNMENT

## 2018-08-31 DIAGNOSIS — Z98.890 POSTOPERATIVE STATE: ICD-10-CM

## 2018-08-31 LAB
ABO GROUP BLD: NORMAL
EKG IMPRESSION: NORMAL
ERYTHROCYTE [DISTWIDTH] IN BLOOD BY AUTOMATED COUNT: 52.5 FL (ref 35.9–50)
HCG SERPL QL: NEGATIVE
HCT VFR BLD AUTO: 44.5 % (ref 37–47)
HGB BLD-MCNC: 13.8 G/DL (ref 12–16)
MCH RBC QN AUTO: 29.7 PG (ref 27–33)
MCHC RBC AUTO-ENTMCNC: 31 G/DL (ref 33.6–35)
MCV RBC AUTO: 95.7 FL (ref 81.4–97.8)
PLATELET # BLD AUTO: 209 K/UL (ref 164–446)
PMV BLD AUTO: 12 FL (ref 9–12.9)
RBC # BLD AUTO: 4.65 M/UL (ref 4.2–5.4)
RH BLD: NORMAL
WBC # BLD AUTO: 17 K/UL (ref 4.8–10.8)

## 2018-08-31 PROCEDURE — 160036 HCHG PACU - EA ADDL 30 MINS PHASE I: Performed by: OBSTETRICS & GYNECOLOGY

## 2018-08-31 PROCEDURE — A6402 STERILE GAUZE <= 16 SQ IN: HCPCS | Performed by: OBSTETRICS & GYNECOLOGY

## 2018-08-31 PROCEDURE — 160029 HCHG SURGERY MINUTES - 1ST 30 MINS LEVEL 4: Performed by: OBSTETRICS & GYNECOLOGY

## 2018-08-31 PROCEDURE — 160035 HCHG PACU - 1ST 60 MINS PHASE I: Performed by: OBSTETRICS & GYNECOLOGY

## 2018-08-31 PROCEDURE — 501445 HCHG STAPLER, SKIN DISP: Performed by: OBSTETRICS & GYNECOLOGY

## 2018-08-31 PROCEDURE — 700101 HCHG RX REV CODE 250

## 2018-08-31 PROCEDURE — A9270 NON-COVERED ITEM OR SERVICE: HCPCS | Performed by: OBSTETRICS & GYNECOLOGY

## 2018-08-31 PROCEDURE — 85027 COMPLETE CBC AUTOMATED: CPT

## 2018-08-31 PROCEDURE — 700111 HCHG RX REV CODE 636 W/ 250 OVERRIDE (IP)

## 2018-08-31 PROCEDURE — 160002 HCHG RECOVERY MINUTES (STAT): Performed by: OBSTETRICS & GYNECOLOGY

## 2018-08-31 PROCEDURE — 700102 HCHG RX REV CODE 250 W/ 637 OVERRIDE(OP)

## 2018-08-31 PROCEDURE — 302152 K-PAD 12X17: Performed by: OBSTETRICS & GYNECOLOGY

## 2018-08-31 PROCEDURE — 160041 HCHG SURGERY MINUTES - EA ADDL 1 MIN LEVEL 4: Performed by: OBSTETRICS & GYNECOLOGY

## 2018-08-31 PROCEDURE — 93005 ELECTROCARDIOGRAM TRACING: CPT | Performed by: OBSTETRICS & GYNECOLOGY

## 2018-08-31 PROCEDURE — 160009 HCHG ANES TIME/MIN: Performed by: OBSTETRICS & GYNECOLOGY

## 2018-08-31 PROCEDURE — 84703 CHORIONIC GONADOTROPIN ASSAY: CPT

## 2018-08-31 PROCEDURE — A4311 CATHETER W/O BAG 2-WAY LATEX: HCPCS | Performed by: OBSTETRICS & GYNECOLOGY

## 2018-08-31 PROCEDURE — 770006 HCHG ROOM/CARE - MED/SURG/GYN SEMI*

## 2018-08-31 PROCEDURE — 36415 COLL VENOUS BLD VENIPUNCTURE: CPT

## 2018-08-31 PROCEDURE — 93010 ELECTROCARDIOGRAM REPORT: CPT | Performed by: INTERNAL MEDICINE

## 2018-08-31 PROCEDURE — 700111 HCHG RX REV CODE 636 W/ 250 OVERRIDE (IP): Performed by: OBSTETRICS & GYNECOLOGY

## 2018-08-31 PROCEDURE — 88305 TISSUE EXAM BY PATHOLOGIST: CPT

## 2018-08-31 PROCEDURE — 500429 HCHG DRESSING, INTERCEED: Performed by: OBSTETRICS & GYNECOLOGY

## 2018-08-31 PROCEDURE — 700105 HCHG RX REV CODE 258: Performed by: OBSTETRICS & GYNECOLOGY

## 2018-08-31 PROCEDURE — A4338 INDWELLING CATHETER LATEX: HCPCS | Performed by: OBSTETRICS & GYNECOLOGY

## 2018-08-31 PROCEDURE — 0UB90ZZ EXCISION OF UTERUS, OPEN APPROACH: ICD-10-PCS | Performed by: OBSTETRICS & GYNECOLOGY

## 2018-08-31 PROCEDURE — A6404 STERILE GAUZE > 48 SQ IN: HCPCS | Performed by: OBSTETRICS & GYNECOLOGY

## 2018-08-31 PROCEDURE — 501838 HCHG SUTURE GENERAL: Performed by: OBSTETRICS & GYNECOLOGY

## 2018-08-31 PROCEDURE — 3E0P7KZ INTRODUCTION OF OTHER DIAGNOSTIC SUBSTANCE INTO FEMALE REPRODUCTIVE, VIA NATURAL OR ARTIFICIAL OPENING: ICD-10-PCS | Performed by: OBSTETRICS & GYNECOLOGY

## 2018-08-31 PROCEDURE — 160048 HCHG OR STATISTICAL LEVEL 1-5: Performed by: OBSTETRICS & GYNECOLOGY

## 2018-08-31 PROCEDURE — A9270 NON-COVERED ITEM OR SERVICE: HCPCS

## 2018-08-31 PROCEDURE — 700102 HCHG RX REV CODE 250 W/ 637 OVERRIDE(OP): Performed by: OBSTETRICS & GYNECOLOGY

## 2018-08-31 RX ORDER — HYDROMORPHONE HYDROCHLORIDE 2 MG/ML
0.5 INJECTION, SOLUTION INTRAMUSCULAR; INTRAVENOUS; SUBCUTANEOUS
Status: DISCONTINUED | OUTPATIENT
Start: 2018-08-31 | End: 2018-09-02 | Stop reason: HOSPADM

## 2018-08-31 RX ORDER — VASOPRESSIN 20 U/ML
INJECTION PARENTERAL
Status: DISPENSED
Start: 2018-08-31 | End: 2018-09-01

## 2018-08-31 RX ORDER — HYDROMORPHONE HYDROCHLORIDE 2 MG/ML
INJECTION, SOLUTION INTRAMUSCULAR; INTRAVENOUS; SUBCUTANEOUS
Status: COMPLETED
Start: 2018-08-31 | End: 2018-08-31

## 2018-08-31 RX ORDER — KETOROLAC TROMETHAMINE 30 MG/ML
30 INJECTION, SOLUTION INTRAMUSCULAR; INTRAVENOUS EVERY 6 HOURS
Status: DISCONTINUED | OUTPATIENT
Start: 2018-08-31 | End: 2018-09-02 | Stop reason: HOSPADM

## 2018-08-31 RX ORDER — ACETAMINOPHEN 500 MG
1000 TABLET ORAL EVERY 6 HOURS
Status: DISCONTINUED | OUTPATIENT
Start: 2018-08-31 | End: 2018-09-02 | Stop reason: HOSPADM

## 2018-08-31 RX ORDER — OXYCODONE HYDROCHLORIDE 10 MG/1
10 TABLET ORAL
Status: DISCONTINUED | OUTPATIENT
Start: 2018-08-31 | End: 2018-09-02 | Stop reason: HOSPADM

## 2018-08-31 RX ORDER — ACETAMINOPHEN 325 MG/1
650 TABLET ORAL ONCE
Status: ACTIVE | OUTPATIENT
Start: 2018-08-31 | End: 2018-09-01

## 2018-08-31 RX ORDER — AMOXICILLIN 250 MG
2 CAPSULE ORAL
Status: DISCONTINUED | OUTPATIENT
Start: 2018-08-31 | End: 2018-09-02 | Stop reason: HOSPADM

## 2018-08-31 RX ORDER — METHYLERGONOVINE MALEATE 0.2 MG/ML
INJECTION INTRAVENOUS
Status: DISPENSED
Start: 2018-08-31 | End: 2018-09-01

## 2018-08-31 RX ORDER — SIMETHICONE 80 MG
80 TABLET,CHEWABLE ORAL EVERY 8 HOURS PRN
Status: DISCONTINUED | OUTPATIENT
Start: 2018-08-31 | End: 2018-09-02 | Stop reason: HOSPADM

## 2018-08-31 RX ORDER — SODIUM CHLORIDE, SODIUM LACTATE, POTASSIUM CHLORIDE, CALCIUM CHLORIDE 600; 310; 30; 20 MG/100ML; MG/100ML; MG/100ML; MG/100ML
INJECTION, SOLUTION INTRAVENOUS CONTINUOUS
Status: DISCONTINUED | OUTPATIENT
Start: 2018-08-31 | End: 2018-09-02 | Stop reason: HOSPADM

## 2018-08-31 RX ORDER — ZOLPIDEM TARTRATE 5 MG/1
5 TABLET ORAL NIGHTLY PRN
Status: DISCONTINUED | OUTPATIENT
Start: 2018-08-31 | End: 2018-09-02 | Stop reason: HOSPADM

## 2018-08-31 RX ORDER — SUFENTANIL CITRATE 50 UG/ML
INJECTION EPIDURAL; INTRAVENOUS
Status: DISPENSED
Start: 2018-08-31 | End: 2018-09-01

## 2018-08-31 RX ORDER — OXYCODONE HCL 5 MG/5 ML
SOLUTION, ORAL ORAL
Status: COMPLETED
Start: 2018-08-31 | End: 2018-08-31

## 2018-08-31 RX ORDER — LABETALOL HYDROCHLORIDE 5 MG/ML
INJECTION, SOLUTION INTRAVENOUS
Status: COMPLETED
Start: 2018-08-31 | End: 2018-08-31

## 2018-08-31 RX ORDER — ONDANSETRON 2 MG/ML
4 INJECTION INTRAMUSCULAR; INTRAVENOUS EVERY 6 HOURS PRN
Status: DISCONTINUED | OUTPATIENT
Start: 2018-08-31 | End: 2018-09-02 | Stop reason: HOSPADM

## 2018-08-31 RX ORDER — KETOROLAC TROMETHAMINE 30 MG/ML
INJECTION, SOLUTION INTRAMUSCULAR; INTRAVENOUS
Status: COMPLETED
Start: 2018-08-31 | End: 2018-08-31

## 2018-08-31 RX ORDER — OXYCODONE HYDROCHLORIDE 5 MG/1
5 TABLET ORAL
Status: DISCONTINUED | OUTPATIENT
Start: 2018-08-31 | End: 2018-09-02 | Stop reason: HOSPADM

## 2018-08-31 RX ORDER — METHYLERGONOVINE MALEATE 0.2 MG/1
0.2 TABLET ORAL EVERY 6 HOURS
Status: COMPLETED | OUTPATIENT
Start: 2018-08-31 | End: 2018-09-01

## 2018-08-31 RX ADMIN — ACETAMINOPHEN 1000 MG: 500 TABLET, FILM COATED ORAL at 20:17

## 2018-08-31 RX ADMIN — SODIUM CHLORIDE, POTASSIUM CHLORIDE, SODIUM LACTATE AND CALCIUM CHLORIDE: 600; 310; 30; 20 INJECTION, SOLUTION INTRAVENOUS at 20:18

## 2018-08-31 RX ADMIN — HYDROMORPHONE HYDROCHLORIDE 0.5 MG: 2 INJECTION INTRAMUSCULAR; INTRAVENOUS; SUBCUTANEOUS at 18:15

## 2018-08-31 RX ADMIN — KETOROLAC TROMETHAMINE 30 MG: 30 INJECTION, SOLUTION INTRAMUSCULAR at 18:15

## 2018-08-31 RX ADMIN — HYDROMORPHONE HYDROCHLORIDE 0.5 MG: 2 INJECTION INTRAMUSCULAR; INTRAVENOUS; SUBCUTANEOUS at 18:10

## 2018-08-31 RX ADMIN — OXYCODONE HYDROCHLORIDE 10 MG: 5 SOLUTION ORAL at 18:00

## 2018-08-31 RX ADMIN — KETOROLAC TROMETHAMINE 30 MG: 30 INJECTION, SOLUTION INTRAMUSCULAR at 23:51

## 2018-08-31 RX ADMIN — ONDANSETRON 4 MG: 2 INJECTION INTRAMUSCULAR; INTRAVENOUS at 20:12

## 2018-08-31 RX ADMIN — SODIUM CHLORIDE, POTASSIUM CHLORIDE, SODIUM LACTATE AND CALCIUM CHLORIDE: 600; 310; 30; 20 INJECTION, SOLUTION INTRAVENOUS at 12:50

## 2018-08-31 RX ADMIN — HYDROMORPHONE HYDROCHLORIDE 0.5 MG: 2 INJECTION INTRAMUSCULAR; INTRAVENOUS; SUBCUTANEOUS at 18:00

## 2018-08-31 RX ADMIN — HYDROMORPHONE HYDROCHLORIDE 0.5 MG: 2 INJECTION INTRAMUSCULAR; INTRAVENOUS; SUBCUTANEOUS at 18:05

## 2018-08-31 RX ADMIN — LABETALOL HYDROCHLORIDE 20 MG: 5 INJECTION, SOLUTION INTRAVENOUS at 17:55

## 2018-08-31 RX ADMIN — METHYLERGONOVINE MALEATE 0.2 MG: 0.2 TABLET ORAL at 21:05

## 2018-08-31 RX ADMIN — OXYCODONE HYDROCHLORIDE 10 MG: 10 TABLET ORAL at 22:06

## 2018-08-31 ASSESSMENT — LIFESTYLE VARIABLES
EVER FELT BAD OR GUILTY ABOUT YOUR DRINKING: NO
TOTAL SCORE: 0
CONSUMPTION TOTAL: NEGATIVE
HAVE PEOPLE ANNOYED YOU BY CRITICIZING YOUR DRINKING: NO
HAVE YOU EVER FELT YOU SHOULD CUT DOWN ON YOUR DRINKING: NO
EVER HAD A DRINK FIRST THING IN THE MORNING TO STEADY YOUR NERVES TO GET RID OF A HANGOVER: NO
TOTAL SCORE: 0
HOW MANY TIMES IN THE PAST YEAR HAVE YOU HAD 5 OR MORE DRINKS IN A DAY: 0
AVERAGE NUMBER OF DAYS PER WEEK YOU HAVE A DRINK CONTAINING ALCOHOL: 1
EVER_SMOKED: YES
ALCOHOL_USE: YES
ON A TYPICAL DAY WHEN YOU DRINK ALCOHOL HOW MANY DRINKS DO YOU HAVE: 4
TOTAL SCORE: 0

## 2018-08-31 ASSESSMENT — COGNITIVE AND FUNCTIONAL STATUS - GENERAL
EATING MEALS: A LITTLE
TURNING FROM BACK TO SIDE WHILE IN FLAT BAD: A LITTLE
SUGGESTED CMS G CODE MODIFIER DAILY ACTIVITY: CK
WALKING IN HOSPITAL ROOM: A LITTLE
DRESSING REGULAR LOWER BODY CLOTHING: A LITTLE
STANDING UP FROM CHAIR USING ARMS: A LITTLE
DRESSING REGULAR UPPER BODY CLOTHING: A LITTLE
MOVING FROM LYING ON BACK TO SITTING ON SIDE OF FLAT BED: A LITTLE
DAILY ACTIVITIY SCORE: 18
SUGGESTED CMS G CODE MODIFIER MOBILITY: CK
CLIMB 3 TO 5 STEPS WITH RAILING: A LITTLE
HELP NEEDED FOR BATHING: A LITTLE
MOVING TO AND FROM BED TO CHAIR: A LITTLE
MOBILITY SCORE: 18
PERSONAL GROOMING: A LITTLE
TOILETING: A LITTLE

## 2018-08-31 ASSESSMENT — PATIENT HEALTH QUESTIONNAIRE - PHQ9
1. LITTLE INTEREST OR PLEASURE IN DOING THINGS: NOT AT ALL
SUM OF ALL RESPONSES TO PHQ9 QUESTIONS 1 AND 2: 0
2. FEELING DOWN, DEPRESSED, IRRITABLE, OR HOPELESS: NOT AT ALL

## 2018-08-31 ASSESSMENT — PAIN SCALES - GENERAL
PAINLEVEL_OUTOF10: 4
PAINLEVEL_OUTOF10: 4
PAINLEVEL_OUTOF10: 9
PAINLEVEL_OUTOF10: 0
PAINLEVEL_OUTOF10: 0
PAINLEVEL_OUTOF10: 6
PAINLEVEL_OUTOF10: 6
PAINLEVEL_OUTOF10: 4
PAINLEVEL_OUTOF10: 9
PAINLEVEL_OUTOF10: 4
PAINLEVEL_OUTOF10: 0
PAINLEVEL_OUTOF10: 4

## 2018-08-31 ASSESSMENT — COPD QUESTIONNAIRES
DO YOU EVER COUGH UP ANY MUCUS OR PHLEGM?: NO/ONLY WITH OCCASIONAL COLDS OR INFECTIONS
DURING THE PAST 4 WEEKS HOW MUCH DID YOU FEEL SHORT OF BREATH: NONE/LITTLE OF THE TIME
HAVE YOU SMOKED AT LEAST 100 CIGARETTES IN YOUR ENTIRE LIFE: NO/DON'T KNOW
IN THE PAST 12 MONTHS DO YOU DO LESS THAN YOU USED TO BECAUSE OF YOUR BREATHING PROBLEMS: DISAGREE/UNSURE

## 2018-09-01 VITALS
SYSTOLIC BLOOD PRESSURE: 122 MMHG | BODY MASS INDEX: 44.12 KG/M2 | DIASTOLIC BLOOD PRESSURE: 65 MMHG | HEIGHT: 61 IN | WEIGHT: 233.69 LBS | RESPIRATION RATE: 15 BRPM | OXYGEN SATURATION: 94 % | HEART RATE: 78 BPM | TEMPERATURE: 98.7 F

## 2018-09-01 LAB
BASOPHILS # BLD AUTO: 0.3 % (ref 0–1.8)
BASOPHILS # BLD: 0.03 K/UL (ref 0–0.12)
EOSINOPHIL # BLD AUTO: 0 K/UL (ref 0–0.51)
EOSINOPHIL NFR BLD: 0 % (ref 0–6.9)
ERYTHROCYTE [DISTWIDTH] IN BLOOD BY AUTOMATED COUNT: 47.2 FL (ref 35.9–50)
HCT VFR BLD AUTO: 34 % (ref 37–47)
HGB BLD-MCNC: 11.6 G/DL (ref 12–16)
IMM GRANULOCYTES # BLD AUTO: 0.04 K/UL (ref 0–0.11)
IMM GRANULOCYTES NFR BLD AUTO: 0.4 % (ref 0–0.9)
LYMPHOCYTES # BLD AUTO: 1 K/UL (ref 1–4.8)
LYMPHOCYTES NFR BLD: 9.4 % (ref 22–41)
MCH RBC QN AUTO: 30.2 PG (ref 27–33)
MCHC RBC AUTO-ENTMCNC: 34.1 G/DL (ref 33.6–35)
MCV RBC AUTO: 88.5 FL (ref 81.4–97.8)
MONOCYTES # BLD AUTO: 0.65 K/UL (ref 0–0.85)
MONOCYTES NFR BLD AUTO: 6.1 % (ref 0–13.4)
NEUTROPHILS # BLD AUTO: 8.89 K/UL (ref 2–7.15)
NEUTROPHILS NFR BLD: 83.8 % (ref 44–72)
NRBC # BLD AUTO: 0 K/UL
NRBC BLD-RTO: 0 /100 WBC
PLATELET # BLD AUTO: 208 K/UL (ref 164–446)
PMV BLD AUTO: 12.4 FL (ref 9–12.9)
RBC # BLD AUTO: 3.84 M/UL (ref 4.2–5.4)
WBC # BLD AUTO: 10.6 K/UL (ref 4.8–10.8)

## 2018-09-01 PROCEDURE — 700102 HCHG RX REV CODE 250 W/ 637 OVERRIDE(OP): Performed by: OBSTETRICS & GYNECOLOGY

## 2018-09-01 PROCEDURE — A9270 NON-COVERED ITEM OR SERVICE: HCPCS | Performed by: OBSTETRICS & GYNECOLOGY

## 2018-09-01 PROCEDURE — 36415 COLL VENOUS BLD VENIPUNCTURE: CPT

## 2018-09-01 PROCEDURE — 700111 HCHG RX REV CODE 636 W/ 250 OVERRIDE (IP): Performed by: OBSTETRICS & GYNECOLOGY

## 2018-09-01 PROCEDURE — 770006 HCHG ROOM/CARE - MED/SURG/GYN SEMI*

## 2018-09-01 PROCEDURE — 700105 HCHG RX REV CODE 258: Performed by: OBSTETRICS & GYNECOLOGY

## 2018-09-01 PROCEDURE — 85025 COMPLETE CBC W/AUTO DIFF WBC: CPT

## 2018-09-01 RX ORDER — LANOLIN ALCOHOL/MO/W.PET/CERES
325 CREAM (GRAM) TOPICAL
Qty: 90 TAB | Refills: 1 | Status: SHIPPED | OUTPATIENT
Start: 2018-09-01

## 2018-09-01 RX ORDER — OXYCODONE HYDROCHLORIDE 5 MG/1
5 TABLET ORAL
Qty: 30 TAB | Refills: 0 | Status: SHIPPED | OUTPATIENT
Start: 2018-09-01 | End: 2018-09-08

## 2018-09-01 RX ORDER — IBUPROFEN 800 MG/1
800 TABLET ORAL EVERY 8 HOURS PRN
Qty: 30 TAB | Refills: 3 | Status: SHIPPED | OUTPATIENT
Start: 2018-09-01

## 2018-09-01 RX ORDER — METHYLERGONOVINE MALEATE 0.2 MG/1
0.2 TABLET ORAL EVERY 6 HOURS
Qty: 8 TAB | Refills: 0 | Status: SHIPPED | OUTPATIENT
Start: 2018-09-01 | End: 2018-09-03

## 2018-09-01 RX ADMIN — ACETAMINOPHEN 1000 MG: 500 TABLET, FILM COATED ORAL at 14:19

## 2018-09-01 RX ADMIN — ACETAMINOPHEN 1000 MG: 500 TABLET, FILM COATED ORAL at 01:43

## 2018-09-01 RX ADMIN — OXYCODONE HYDROCHLORIDE 10 MG: 10 TABLET ORAL at 14:19

## 2018-09-01 RX ADMIN — OXYCODONE HYDROCHLORIDE 10 MG: 10 TABLET ORAL at 17:35

## 2018-09-01 RX ADMIN — KETOROLAC TROMETHAMINE 30 MG: 30 INJECTION, SOLUTION INTRAMUSCULAR at 05:03

## 2018-09-01 RX ADMIN — METHYLERGONOVINE MALEATE 0.2 MG: 0.2 TABLET ORAL at 03:11

## 2018-09-01 RX ADMIN — OXYCODONE HYDROCHLORIDE 10 MG: 10 TABLET ORAL at 08:01

## 2018-09-01 RX ADMIN — SODIUM CHLORIDE, POTASSIUM CHLORIDE, SODIUM LACTATE AND CALCIUM CHLORIDE: 600; 310; 30; 20 INJECTION, SOLUTION INTRAVENOUS at 01:38

## 2018-09-01 RX ADMIN — KETOROLAC TROMETHAMINE 30 MG: 30 INJECTION, SOLUTION INTRAMUSCULAR at 17:35

## 2018-09-01 RX ADMIN — KETOROLAC TROMETHAMINE 30 MG: 30 INJECTION, SOLUTION INTRAMUSCULAR at 12:54

## 2018-09-01 RX ADMIN — ONDANSETRON 4 MG: 2 INJECTION INTRAMUSCULAR; INTRAVENOUS at 14:19

## 2018-09-01 RX ADMIN — ACETAMINOPHEN 1000 MG: 500 TABLET, FILM COATED ORAL at 08:01

## 2018-09-01 RX ADMIN — METHYLERGONOVINE MALEATE 0.2 MG: 0.2 TABLET ORAL at 08:01

## 2018-09-01 ASSESSMENT — PAIN SCALES - GENERAL
PAINLEVEL_OUTOF10: 3
PAINLEVEL_OUTOF10: 4
PAINLEVEL_OUTOF10: 4

## 2018-09-01 NOTE — DISCHARGE INSTRUCTIONS
Discharge Instructions    Discharged to home by car with relative. Discharged via wheelchair, hospital escort: Yes.  Special equipment needed: Not Applicable    Be sure to schedule a follow-up appointment with your primary care doctor or any specialists as instructed.     Discharge Plan:   Diet Plan: Discussed  Activity Level: Discussed  Medication Reconciliation Updated: Yes  Influenza Vaccine Indication: Indicated: Not available from distributor/    I understand that a diet low in cholesterol, fat, and sodium is recommended for good health. Unless I have been given specific instructions below for another diet, I accept this instruction as my diet prescription.   Other diet: Full liquid diet     Special Instructions: None    · Is patient discharged on Warfarin / Coumadin?   No     Myomectomy, Care After  Refer to this sheet in the next few weeks. These instructions provide you with information on caring for yourself after your procedure. Your health care provider may also give you more specific instructions. Your treatment has been planned according to current medical practices, but problems sometimes occur. Call your health care provider if you have any problems or questions after your procedure.  WHAT TO EXPECT AFTER THE PROCEDURE  After your procedure, it is typical to have the following:  · Pain in your abdomen, especially at any incision sites. You will be given pain medicine to control the pain.  · Tiredness. This is a normal part of the recovery process. Your energy level will return to normal over the next several weeks.  · Constipation.  · Vaginal bleeding. This is normal and should stop after 1-2 weeks.  HOME CARE INSTRUCTIONS   · Only take over-the-counter or prescription medicines as directed by your health care provider. Avoid aspirin because it can cause bleeding.  · Do not douche, use tampons, or have sexual intercourse until given permission by your health care provider.  · Remove or  change any bandages (dressings) as directed by your health care provider.  · Take showers instead of baths as directed by your health care provider.  · You will probably be able to go back to your normal routine after a few days. Do not do anything that requires extra effort until your health care provider says it is okay. Do not lift anything heavier than 15 pounds (6.8 kg) until your health care provider approves.  · Walk daily but take frequent rest breaks if you tire easily.  · Continue to practice deep breathing and coughing. If it hurts to cough, try holding a pillow against your belly as you cough.  · If you become constipated, you may:  ¨ Use a mild laxative if your health care provider approves.  ¨ Add more fruit and bran to your diet.  ¨ Drink enough fluids to keep your urine clear or pale yellow.  · Take your temperature twice a day and write it down.  · Do not drink alcohol.  · Do not drive until your health care provider approves.  · Have someone help you at home for 1 week or until you can do your own household activities.  · Follow up with your health care provider as directed.  SEEK MEDICAL CARE IF:  · You have a fever.  · You have increasing abdominal pain that is not relieved with medicine.  · You have nausea, vomiting, or diarrhea.  · You have pain when you urinate, or you have blood in your urine.  · You have a rash on your body.  · You have pain or redness where your IV access tube was inserted.  · You have redness, swelling, or any kind of drainage from an incision.  SEEK IMMEDIATE MEDICAL CARE IF:   · You have weakness or lightheadedness.  · You have pain, swelling, or redness in your legs.  · You have chest pain.  · You faint.  · You have shortness of breath.  · You have heavy vaginal bleeding.  · Your incision is opening up.     This information is not intended to replace advice given to you by your health care provider. Make sure you discuss any questions you have with your health care  provider.     Document Released: 05/09/2012 Document Revised: 01/08/2016 Document Reviewed: 07/30/2014  [employer] Interactive Patient Education ©2016 [employer]      Depression / Suicide Risk    As you are discharged from this [facility] facility, it is important to learn how to keep safe from harming yourself.    Recognize the warning signs:  · Abrupt changes in personality, positive or negative- including increase in energy   · Giving away possessions  · Change in eating patterns- significant weight changes-  positive or negative  · Change in sleeping patterns- unable to sleep or sleeping all the time   · Unwillingness or inability to communicate  · Depression  · Unusual sadness, discouragement and loneliness  · Talk of wanting to die  · Neglect of personal appearance   · Rebelliousness- reckless behavior  · Withdrawal from people/activities they love  · Confusion- inability to concentrate     If you or a loved one observes any of these behaviors or has concerns about self-harm, here's what you can do:  · Talk about it- your feelings and reasons for harming yourself  · Remove any means that you might use to hurt yourself (examples: pills, rope, extension cords, firearm)  · Get professional help from the community (Mental Health, Substance Abuse, psychological counseling)  · Do not be alone:Call your Safe Contact- someone whom you trust who will be there for you.  · Call your local CRISIS HOTLINE 430-3578 or 846-394-9680  · Call your local Children's Mobile Crisis Response Team [place] (286) 893-5126 or www.[employer]  · Call the toll free National Suicide Prevention Hotlines   · National Suicide Prevention Lifeline 864-339-YEEH (9711)  · National Hope Line Network 800-SUICIDE (248-3074)

## 2018-09-01 NOTE — PROGRESS NOTES
Patient arrived onto unit with patient transport and  at bedside.     A&O x4.   VSS.  On 3L O2  IVF LR 125mL/hr  On bed rest  Orders to advance to clear diet, current complaints of some nausea upon movement, medicated per MAR  Skin: incision site to lower pannus, dressing in place; unable to visualize sacrum, pt in too much pain to tolerate turns  Last BM 8/31/18 PTA per pt report  Gonzalez catheter inserted     Call light and personal belongings within reach.  Family at bedside. POC discussed and all questions answered.  Hourly rounding in place.  No additional needs at this time.    2 RN skin check.

## 2018-09-01 NOTE — DIETARY
NUTRITION SERVICES: BMI - Pt with BMI >40 (=44.18). Weight loss counseling not appropriate in acute care setting. RECOMMEND - Referral to outpatient nutrition services for weight management after D/C.

## 2018-09-01 NOTE — DISCHARGE SUMMARY
Discharge Summary:      Radha Joshi      Admit Date:   2018  Discharge Date:  2018     Admitting diagnosis:  Intramural leiomyoma of uterus [D25.1]  Discharge Diagnosis: Status post Transabdominal Myomectomy , chromopertubation .     History:  Past Medical History:   Diagnosis Date   • Asthma    • Pneumonia     Scar tissue remains, activity induced breathing problems, uses a daily and rescue, haven't used for over a year     OB History    Para Term  AB Living   2 0 0   1 1   SAB TAB Ectopic Molar Multiple Live Births   1         1      # Outcome Date GA Lbr Cooper/2nd Weight Sex Delivery Anes PTL Lv   2  11/09/15    F CS-LTranv Spinal  ZELDA      Complications: Failure to Progress in First Stage,Fetal Intolerance   1 SAB                    Erythromycin  Patient Active Problem List    Diagnosis Date Noted   • Intramural leiomyoma of uterus 2015        Hospital Course:   32 y.o. , PRev C/S x 1 was admitted with the above mentioned diagnosis, underwent myomectomy , . Patient postoperative  course was unremarkable, with progressive advancement in diet , ambulation and toleration of oral analgesia. Patient without complaints today and desires discharge.      Vitals:    18 2145 18 2220 18 0405 18 0715   BP: 127/74 132/76 128/67 123/64   Pulse: 72 65 93 83   Resp:  16   Temp: 37 °C (98.6 °F) 37.1 °C (98.7 °F) 37.2 °C (98.9 °F) 36.7 °C (98.1 °F)   SpO2: 99% 98% 97% 97%   Weight:       Height:           Current Facility-Administered Medications   Medication Dose   • lactated ringers infusion     • Pharmacy Consult Request ...Pain Management Review 1 Each  1 Each   • acetaminophen (TYLENOL) tablet 1,000 mg  1,000 mg   • ketorolac (TORADOL) injection 30 mg  30 mg   • oxyCODONE immediate-release (ROXICODONE) tablet 5 mg  5 mg   • oxyCODONE immediate-release (ROXICODONE) tablet 10 mg  10 mg   • ondansetron (ZOFRAN) syringe/vial  injection 4 mg  4 mg   • HYDROmorphone (DILAUDID) injection 0.5 mg  0.5 mg   • acetaminophen (TYLENOL) tablet 650 mg  650 mg   • simethicone (MYLICON) chewable tab 80 mg  80 mg   • zolpidem (AMBIEN) tablet 5 mg  5 mg   • senna-docusate (PERICOLACE or SENOKOT S) 8.6-50 MG per tablet 2 Tab  2 Tab       Exam:  Breast Exam: Inspection negative. No nipple discharge or bleeding. No masses or nodularity palpable, negative findings: normal in size and symmetry, normal contour with no evidence of flattening or dimpling, skin normal, nipples everted without rashes or discharge  Abdomen: Abdomen soft, non-tender. BS normal. No masses,  No organomegaly  Fundus Non Tender: yes  Incision: dry and intact, healing well with good reapproximation, no evidence of infection, separation or keloid formation.  Perineum: perineum intact  Extremity: extremities, peripheral pulses and reflexes normal     Labs:  Recent Labs      08/31/18   2146  09/01/18   0359   WBC  17.0*  10.6   RBC  4.65  3.84*   HEMOGLOBIN  13.8  11.6*   HEMATOCRIT  44.5  34.0*   MCV  95.7  88.5   MCH  29.7  30.2   MCHC  31.0*  34.1   RDW  52.5*  47.2   PLATELETCT  209  208   MPV  12.0  12.4        Activity:   Discharge to home  Pelvic Rest x 6 weeks    Assessment:  Postoperative Myomectomy , with stable H/H and vitals   Follow up: . [facility] in  ; 1 week for incision check.      Discharge Meds:   Current Outpatient Prescriptions   Medication Sig Dispense Refill   • oxyCODONE immediate-release (ROXICODONE) 5 MG Tab Take 1 Tab by mouth every 3 hours as needed for up to 7 days. 30 Tab 0   • ibuprofen (MOTRIN) 800 MG Tab Take 1 Tab by mouth every 8 hours as needed for Moderate Pain. 30 Tab 3   • methylergonovine (METHERGINE) 0.2 MG Tab Take 1 Tab by mouth every 6 hours for 2 days. 8 Tab 0   • ferrous sulfate 325 (65 Fe) MG EC tablet Take 1 Tab by mouth 3 times a day, with meals. 90 Tab 1       Rey Barragan M.D.

## 2018-09-01 NOTE — CARE PLAN
Problem: Safety  Goal: Will remain free from falls  Patient is a low fall risk. Fall risk band on patient. Patient educated to call for assistance. Call light left within reach of patient. Hourly rounding in place.    Problem: Infection  Goal: Will remain free from infection    Intervention: Implement standard precautions and perform hand washing before and after patient contact  Hand hygiene performed prior to and after entering pt room. Gloves worn during patient interactions.

## 2018-09-01 NOTE — PROGRESS NOTES
Called Post-partum charge RN to discuss MD order to transfer to postpartum. Post-partum is full and has long Sx schedule for today. Doubts room will open up for this patient and requests that RN check back after 1500 for room update.

## 2018-09-01 NOTE — OR SURGEON
Immediate Post OP Note    PreOp Diagnosis: Symptomatic massive Uterine Leiomyoma                                 Menorrhagia                                 Desires Fertility     PostOp Diagnosis: Symptomatic massive Uterine Leiomyoma                                 Menorrhagia                                 Desires Fertility         Procedure(s):  MYOMECTOMY- TRANSABDOMINAL, Chromopertubation     Surgeon(s):  REY Hawthorne M.D.    Anesthesiologist/Type of Anesthesia:  Anesthesiologist: Kaushal Gallegos M.D./* No anesthesia type entered *    Surgical Staff:  Circulator: Mavis Murrell R.N.; Nj Chau R.N.  Relief Scrub: Lauren Sparrow  Scrub Person: Danika Newman [place]: Milly Tijerina R.N.    Specimens removed if any:  * No specimens in log *    Estimated Blood Loss: 500 cc    Findings: 20 cm , large intramural fibroid , small posterior  Fibroid  1.5 cm     Complications: none        8/31/2018 5:34 PM Rey Barragan M.D.

## 2018-09-01 NOTE — OR NURSING
1732- pt from or to pacu connected to all monitoring equip report from Rn and Dr Gallegos   Pt is sleep but easily arousable and denies pain and nausea at this time. bp elevated dr quezada says to just cont to monitor and call if stays elevated   1810-called dr gallegos bp remaining elevated new orders recvd and iv labatolol given per orders   1830-medicated with iv toradol ok per dr gallegos and dr williamson to start in pacu medicated with oxycodone and iv dilaudid cold pack  placed to inc area pt c/o being hot cooling measures provided with cool washcloth and cool air swapped out gown and given sheet verses blanket.  at bedside with pt.   1845- tolerating po water and ice  Chips without nausea   bp and pain improving     1900- resting comfortable ready to go to room waiting on floor nurse available to recve report   1915- report called to que on gsu   200cc out of dee   1940pt transferred to room t428-1 via [facility] with her .  states he has all her belongings

## 2018-09-01 NOTE — PROGRESS NOTES
Report received from NOC shift RN.   A/O X 4. Room air.   VSS. HTN this am d/t pain.   Labs reviewed.   Hgb 13.8 to 11.6 post op.   BS hypoactive X 4.   -flatus. -BM.   +void. 200mL slightly bloody but clear.   Peripad clean.   Patient able to ambulate to bathroom with handheld assist.   Medicated for pain and methergine administered.   PIV on keft wrist running LR at 125mL/hr, rate adjusted to 10mL/hr per MAR.   Call light at bedside.

## 2018-09-02 NOTE — PROGRESS NOTES
Discharge and follow up instructions discussed with patient. Discharge scripts given to patient. Abdominal binder ordered for patient for additional abdominal support during transfer home and for home ambulation. PIV and ID band removed. Wheelchair acquired for transport down to vehicle.

## 2018-09-10 NOTE — OP REPORT
DATE OF SERVICE:  08/31/2018    PREOPERATIVE DIAGNOSES:  Symptomatic massive uterine leiomyomata, menorrhagia,   desires fertility.    POSTOPERATIVE DIAGNOSES:  Symptomatic massive uterine leiomyomata,   menorrhagia, desires fertility.  Patent fallopian tubes bilateral.    PRINCIPAL PROCEDURE:  Transabdominal myomectomy, chromopertubation.    SURGEON:  Rey Barragan MD    ASSISTANT:  Jessica Pierre MD    ANESTHESIOLOGIST:  Kaushal Gallegos MD    ANESTHESIA:  General endotracheal anesthesia.    PRINCIPAL PROCEDURE AS FOLLOWS:  After adequate general endotracheal   anesthesia was ascertained, the patient was prepped and draped in a normal   dorsolithotomy position.  Exam under anesthesia revealed the uterus to be   approximately 18 weeks in size and extremely mobile with what apparently was a   large fundal fibroid.  This patient had been previously evaluated by multiple   imaging methods and had been previously treated with 3 months of GnRH Kenalog   therapy and the uterus had shrunk to some degree allowing this mobility and   as such the decision was made to enter the abdominal cavity via a Pfannenstiel   incision versus a midvertical secondary to this mobility.  At this point, the   attention was drawn to the vaginal vault whereby the cervix was grasped with   a single tooth tenaculum and was brought down into the mid plane of the   vagina.  The uterus was sounded to approximately 10 cm and subsequent to this,   a slight endocervical dilatation was effected to a #6 or #7 Sharmila, then   passing a Kae II 8 cm balloon transcervically into the uterine cavity and   hooking this balloon, and Kae II up to an IV catheter and a solution of   methylene blue in normal saline to allow staining of the endometrial cavity.    This was affected.  This instrumentation was allowed to be accessible for   intraoperative staining of the uterine cavity as well as for assessment of   patent fallopian tubes.  The attention  therefore after the patient was   appropriately frog legged was for a low transverse incision, which was made   circumferentially around a previous scar.  The scar was taken down to the   fascia and the fascia was incised medial to lateral.  The rectus muscles were   dissected superiorly and inferiorly up to rectus fascia.  Subsequent to this,   the peritoneum was entered sharply with Metzenbaum scissors and extended   superiorly.  It should be mentioned that careful further cephalad extension   was affected by sharply dissecting the fascia off of the rectus abdominis   muscles allowing lateral splay of the abdominal incision.  Upon doing so, the   uterine fundus could be palpated and the uterus was manipulated and the   uterine fundus and fibroid were brought into an abdominal incision and with   careful pressure and levering of the fundus, the uterus was delivered into the   abdominal incision.  Photodocumentation of this massively large uterus to   approximately 17-18 weeks' size fundal fibroid was seen posteriorly.  The   uterotubal junctions were identified on right and left inferior to the mass as   previously noted and the uterus proper was identified as well as the lower   segment, there was only one small 2 mm fibroid posteriorly that could be   identified separate from the large fibroid tumor as previously noted.    Utilizing a Pitressin based solution of 20 units of Pitressin and 40 mL of   saline, this Pitressin based solution was injected circumferentially around   the fundus of the uterus and around the fibroid as well as laterally at the   level of the uterine vasculature.  At this point, using a micro tip Bovie at   the fundus of the uterus and going anterior to posterior, approximately a 10   cm incision was made down to the capsule of the fibroid.  At this point,   utilizing Allis clamps on the leaves of the myometrium, the fibroid were   sharply and digitally dissected free from the uterine  muscles and from the   uterine wall in toto.  With careful attention towards dissecting away from the   uterotubal junctions right and left, the inferior margin and the fibroid was   still above the level of the endometrial cavity; however, in sowing,   connecting the cavity to the myometrial muscles with interrupted multiple   sutures of 2-0 Vicryl suture ligature.  There was some blue staining which was   indicative of some impingement on the endometrial cavity.  It should also be   noted that at this point chromopertubation was affected in the uterus.  The   tubes filled and spilled bilaterally and at this point trimming, the excess   serosal tissue was affected and the uterus was closed starting in the inferior   margin of the base of the fibroid crater and this was slowly imbricated and   oversewn incorporating the myometrium and closing dead space with 0 Vicryl   suture ligature and 2-0 with Vicryl suture ligature to the point of creating a   single connection transversely and small vertical extension on the fundus of   the uterus proper.  Affectively, the myometrium was then oversewn with a   running interlocking Lembert's type suture imbricating and the serosa was then   closed with a running interlocking baseball stitch.  Copious irrigation was   affected.  The uterus and all suture lines were inspected and found to be   hemostatic.  The uterus was palpated and showed no evidence of developing   hematomas or accumulation of blood, any dead spaces in the uterus proper.  At   the level of the fundus, there was slight indentation which was felt to be due   to a slight dead space and this was oversewn with 0 Vicryl suture ligature in   a figure-of-eight fashion bringing the serosal edges together, the myometrial   edges together and obscuring any dead space.  At this point with hemostasis   assured, the abdomen was copiously irrigated.  The fundal incision was then   oversewn with a layer of Interceed which was  tacked down to the uterus proper   with 4-0 Vicryl suture ligature.  All suture lines were covered with   Interceed.  The uterus was carefully placed back into the peritoneal cavity.    The gutters were cleansed of all clots, the peritoneum was closed with 0   Vicryl suture ligature.  The rectus muscles were copiously inspected and   closed with interrupted 0 Vicryl suture ligature.  At this point, the fascia   was closed with 0 Vicryl going right to left, left to right crossing in the   midline.  The subcutaneous tissues were copiously irrigated.  Small capillary   bleeders individually coagulated.  The subcutaneous tissues were approximated   with interrupted 3-0 Vicryl and the skin was closed with 4-0 subcuticular   Vicryl and dressed with Mepilex silver dressing and Tegaderm dressings after   the incision line with Steri-Strips with benzoin.  The patient was awakened,   extubated and moved to the postoperative recovery room with normal vital   signs, stable respirations.  Estimated blood loss for this procedure was 500   mL.       ____________________________________     MD DEYA HUIZAR / MAENA    DD:  09/10/2018 11:59:32  DT:  09/10/2018 13:09:39    D#:  [id number]  Job#:  [id number]

## 2018-09-11 ENCOUNTER — GYNECOLOGY VISIT (OUTPATIENT)
Dept: OBGYN | Facility: CLINIC | Age: 33
End: 2018-09-11
Payer: OTHER GOVERNMENT

## 2018-09-11 VITALS
WEIGHT: 232 LBS | BODY MASS INDEX: 43.8 KG/M2 | HEIGHT: 61 IN | DIASTOLIC BLOOD PRESSURE: 74 MMHG | SYSTOLIC BLOOD PRESSURE: 122 MMHG

## 2018-09-11 DIAGNOSIS — Z30.011 FAMILY PLANNING, BCP (BIRTH CONTROL PILLS) INITIAL PRESCRIPTION: ICD-10-CM

## 2018-09-11 DIAGNOSIS — Z09 POSTOP CHECK: ICD-10-CM

## 2018-09-11 PROCEDURE — 99024 POSTOP FOLLOW-UP VISIT: CPT | Performed by: OBSTETRICS & GYNECOLOGY

## 2018-09-11 RX ORDER — DROSPIRENONE AND ETHINYL ESTRADIOL 0.02-3(28)
1 KIT ORAL DAILY
Qty: 28 TAB | Refills: 6 | Status: SHIPPED | OUTPATIENT
Start: 2018-09-11

## 2018-09-11 NOTE — PROGRESS NOTES
"SUBJECTIVE:  Radha Joshi presents to the clinic 2 weeks following Exetnsive Myomectomy     Eating a regular diet without difficulty. Bowel movement are Normal.  The patient is not having any pain. Spotting. Patient Denies Incisional pain, drainage or redness    OBJECTIVE:  /74   Ht 1.549 m (5' 0.98\")   Wt 105.2 kg (232 lb)   BMI 43.86 kg/m²   Current Outpatient Prescriptions on File Prior to Visit   Medication Sig Dispense Refill   • ibuprofen (MOTRIN) 800 MG Tab Take 1 Tab by mouth every 8 hours as needed for Moderate Pain. 30 Tab 3   • ferrous sulfate 325 (65 Fe) MG EC tablet Take 1 Tab by mouth 3 times a day, with meals. 90 Tab 1   • montelukast (SINGULAIR) 10 MG Tab Take 10 mg by mouth every day.     • acetaminophen (TYLENOL) 500 MG Tab Take 500-1,000 mg by mouth as needed.     • Melatonin 5 MG Tab Take 2 Tabs by mouth every bedtime.     • Multiple Vitamins-Minerals (EQ MULTIVITAMINS ADULT GUMMY) Chew Tab Take 2 Tabs by mouth every day.     • ferrous sulfate 325 (65 FE) MG tablet Take 1 Tab by mouth 3 times a day, with meals. 90 Tab 3     No current facility-administered medications on file prior to visit.        Constitutional:  alert, no distress.  Abdomen:  soft, bowel sounds active, non-tender.  Incision:  healing well, no drainage, no erythema, no hernia, no seroma, no swelling, well approximated, no dehiscence, incision well approximated.    IMPRESSION: Doing well postoperatively.  Pt is to increase activities as tolerated.    Lab:   Recent Results (from the past 1008 hour(s))   CBC WITH DIFFERENTIAL    Collection Time: 08/20/18 12:03 PM   Result Value Ref Range    WBC 11.4 (H) 4.8 - 10.8 K/uL    RBC 5.03 4.20 - 5.40 M/uL    Hemoglobin 14.9 12.0 - 16.0 g/dL    Hematocrit 44.5 37.0 - 47.0 %    MCV 88.5 81.4 - 97.8 fL    MCH 29.6 27.0 - 33.0 pg    MCHC 33.5 (L) 33.6 - 35.0 g/dL    RDW 49.2 35.9 - 50.0 fL    Platelet Count 274 164 - 446 K/uL    MPV 12.9 9.0 - 12.9 fL    " Neutrophils-Polys 84.30 (H) 44.00 - 72.00 %    Lymphocytes 9.10 (L) 22.00 - 41.00 %    Monocytes 5.00 0.00 - 13.40 %    Eosinophils 0.60 0.00 - 6.90 %    Basophils 0.40 0.00 - 1.80 %    Immature Granulocytes 0.60 0.00 - 0.90 %    Nucleated RBC 0.00 /100 WBC    Neutrophils (Absolute) 9.61 (H) 2.00 - 7.15 K/uL    Lymphs (Absolute) 1.04 1.00 - 4.80 K/uL    Monos (Absolute) 0.57 0.00 - 0.85 K/uL    Eos (Absolute) 0.07 0.00 - 0.51 K/uL    Baso (Absolute) 0.04 0.00 - 0.12 K/uL    Immature Granulocytes (abs) 0.07 0.00 - 0.11 K/uL    NRBC (Absolute) 0.00 K/uL   HCG QUANT SERUM    Collection Time: 18 12:03 PM   Result Value Ref Range    Bhcg <0.6 0.0 - 5.0 mIU/mL   COD (Adult)    Collection Time: 18 12:03 PM   Result Value Ref Range    ABO Grouping Only O     Rh Grouping Only POS     Antibody Screen-Cod NEG    BMP    Collection Time: 18 12:03 PM   Result Value Ref Range    Sodium 140 135 - 145 mmol/L    Potassium 3.9 3.6 - 5.5 mmol/L    Chloride 107 96 - 112 mmol/L    Co2 22 20 - 33 mmol/L    Glucose 94 65 - 99 mg/dL    Bun 12 8 - 22 mg/dL    Creatinine 0.99 0.50 - 1.40 mg/dL    Calcium 9.8 8.5 - 10.5 mg/dL    Anion Gap 11.0 0.0 - 11.9   ESTIMATED GFR    Collection Time: 18 12:03 PM   Result Value Ref Range    GFR If African American >60 >60 mL/min/1.73 m 2    GFR If Non African American >60 >60 mL/min/1.73 m 2   ABO AND RH CONFIRMATION    Collection Time: 18 12:36 PM   Result Value Ref Range    ABO Confirm O     Second Rh Group POS    HCG QUAL SERUM    Collection Time: 18 12:39 PM   Result Value Ref Range    Beta-Hcg Qualitative Serum Negative Negative   EKG    Collection Time: 18  1:35 PM   Result Value Ref Range    Report       [facility]    Test Date:  2018  Pt Name:    MARIO HOUSTONDO WALKER     Department: [facility]  MRN:        [id number]                      Room:       [facility]  Gender:     Female                       Technician: CRISTAL  :        1985                    Requested By:RANDY GUERRERO  Order #:    [id number]                    Reading MD: Alize Siddiqi MD    Measurements  Intervals                                Axis  Rate:       74                           P:          36  NJ:         148                          QRS:        102  QRSD:       84                           T:          -8  QT:         384  QTc:        426    Interpretive Statements  SINUS RHYTHM  CONSIDER RIGHT VENTRICULAR HYPERTROPHY  BORDERLINE T ABNORMALITIES, INFERIOR LEADS  No previous ECG available for comparison    Electronically Signed On 8- 15:33:51 PDT by Alize Siddiqi MD     CBC WITHOUT DIFFERENTIAL 4 HRS AFTER SURGERY    Collection Time: 08/31/18  9:46 PM   Result Value Ref Range    WBC 17.0 (H) 4.8 - 10.8 K/uL    RBC 4.65 4.20 - 5.40 M/uL    Hemoglobin 13.8 12.0 - 16.0 g/dL    Hematocrit 44.5 37.0 - 47.0 %    MCV 95.7 81.4 - 97.8 fL    MCH 29.7 27.0 - 33.0 pg    MCHC 31.0 (L) 33.6 - 35.0 g/dL    RDW 52.5 (H) 35.9 - 50.0 fL    Platelet Count 209 164 - 446 K/uL    MPV 12.0 9.0 - 12.9 fL   CBC WITH DIFFERENTIAL IN AM    Collection Time: 09/01/18  3:59 AM   Result Value Ref Range    WBC 10.6 4.8 - 10.8 K/uL    RBC 3.84 (L) 4.20 - 5.40 M/uL    Hemoglobin 11.6 (L) 12.0 - 16.0 g/dL    Hematocrit 34.0 (L) 37.0 - 47.0 %    MCV 88.5 81.4 - 97.8 fL    MCH 30.2 27.0 - 33.0 pg    MCHC 34.1 33.6 - 35.0 g/dL    RDW 47.2 35.9 - 50.0 fL    Platelet Count 208 164 - 446 K/uL    MPV 12.4 9.0 - 12.9 fL    Neutrophils-Polys 83.80 (H) 44.00 - 72.00 %    Lymphocytes 9.40 (L) 22.00 - 41.00 %    Monocytes 6.10 0.00 - 13.40 %    Eosinophils 0.00 0.00 - 6.90 %    Basophils 0.30 0.00 - 1.80 %    Immature Granulocytes 0.40 0.00 - 0.90 %    Nucleated RBC 0.00 /100 WBC    Neutrophils (Absolute) 8.89 (H) 2.00 - 7.15 K/uL    Lymphs (Absolute) 1.00 1.00 - 4.80 K/uL    Monos (Absolute) 0.65 0.00 - 0.85 K/uL    Eos (Absolute) 0.00 0.00 - 0.51 K/uL    Baso (Absolute) 0.03 0.00 - 0.12 K/uL    Immature  Granulocytes (abs) 0.04 0.00 - 0.11 K/uL    NRBC (Absolute) 0.00 K/uL       PLAN:  Continue any current medications.  (See Med List for details.)  Return to clinic  : in 3 week(s)  Start OCP's : johanny.     WDL

## 2018-10-09 ENCOUNTER — GYNECOLOGY VISIT (OUTPATIENT)
Dept: OBGYN | Facility: CLINIC | Age: 33
End: 2018-10-09
Payer: OTHER GOVERNMENT

## 2018-10-09 VITALS
WEIGHT: 233 LBS | HEIGHT: 61 IN | DIASTOLIC BLOOD PRESSURE: 68 MMHG | SYSTOLIC BLOOD PRESSURE: 118 MMHG | BODY MASS INDEX: 43.99 KG/M2

## 2018-10-09 DIAGNOSIS — Z09 POSTOP CHECK: ICD-10-CM

## 2018-10-09 PROCEDURE — 99024 POSTOP FOLLOW-UP VISIT: CPT | Performed by: OBSTETRICS & GYNECOLOGY

## 2018-10-09 NOTE — PROGRESS NOTES
"SUBJECTIVE:  Radha Joshi presents to the clinic 6  weeks following Myomectomy , started OCP's     Eating a regular diet without difficulty. Bowel movement are Normal.  The patient is not having any pain. No bleeding . Patient Denies Incisional pain, drainage or redness    OBJECTIVE:  /68 (BP Location: Right arm, Patient Position: Sitting)   Ht 1.549 m (5' 1\")   Wt 105.7 kg (233 lb)   BMI 44.02 kg/m²   Current Outpatient Prescriptions on File Prior to Visit   Medication Sig Dispense Refill   • drospirenone-ethinyl estradiol (NYA) 3-0.02 MG per tablet Take 1 Tab by mouth every day. 28 Tab 6   • ibuprofen (MOTRIN) 800 MG Tab Take 1 Tab by mouth every 8 hours as needed for Moderate Pain. 30 Tab 3   • ferrous sulfate 325 (65 Fe) MG EC tablet Take 1 Tab by mouth 3 times a day, with meals. 90 Tab 1   • montelukast (SINGULAIR) 10 MG Tab Take 10 mg by mouth every day.     • acetaminophen (TYLENOL) 500 MG Tab Take 500-1,000 mg by mouth as needed.     • Melatonin 5 MG Tab Take 2 Tabs by mouth every bedtime.     • Multiple Vitamins-Minerals (EQ MULTIVITAMINS ADULT GUMMY) Chew Tab Take 2 Tabs by mouth every day.     • ferrous sulfate 325 (65 FE) MG tablet Take 1 Tab by mouth 3 times a day, with meals. 90 Tab 3     No current facility-administered medications on file prior to visit.        Constitutional:  alert, no distress.  Abdomen:  soft, bowel sounds active, non-tender.  Incision:  healing well, no drainage, no erythema, no hernia, no seroma, no swelling, no dehiscence, incision well approximated.    IMPRESSION: Doing well postoperatively.  Pt is to increase activities as tolerated.    Lab:   Recent Results (from the past 1008 hour(s))   ABO AND RH CONFIRMATION    Collection Time: 08/31/18 12:36 PM   Result Value Ref Range    ABO Confirm O     Second Rh Group POS    HCG QUAL SERUM    Collection Time: 08/31/18 12:39 PM   Result Value Ref Range    Beta-Hcg Qualitative Serum Negative Negative "   EKG    Collection Time: 18  1:35 PM   Result Value Ref Range    Report       [facility]    Test Date:  2018  Pt Name:    MARIO WALKER     Department: [facility]  MRN:        [id number]                      Room:       [facility]  Gender:     Female                       Technician: CRISTAL  :        10                   Requested By:RANDY GUERRERO  Order #:    [id number]                    Reading MD: Alize Siddiqi MD    Measurements  Intervals                                Axis  Rate:       74                           P:          36  IL:         148                          QRS:        102  QRSD:       84                           T:          -8  QT:         384  QTc:        426    Interpretive Statements  SINUS RHYTHM  CONSIDER RIGHT VENTRICULAR HYPERTROPHY  BORDERLINE T ABNORMALITIES, INFERIOR LEADS  No previous ECG available for comparison    Electronically Signed On 2018 15:33:51 PDT by Alize Siddiqi MD     CBC WITHOUT DIFFERENTIAL 4 HRS AFTER SURGERY    Collection Time: 18  9:46 PM   Result Value Ref Range    WBC 17.0 (H) 4.8 - 10.8 K/uL    RBC 4.65 4.20 - 5.40 M/uL    Hemoglobin 13.8 12.0 - 16.0 g/dL    Hematocrit 44.5 37.0 - 47.0 %    MCV 95.7 81.4 - 97.8 fL    MCH 29.7 27.0 - 33.0 pg    MCHC 31.0 (L) 33.6 - 35.0 g/dL    RDW 52.5 (H) 35.9 - 50.0 fL    Platelet Count 209 164 - 446 K/uL    MPV 12.0 9.0 - 12.9 fL   CBC WITH DIFFERENTIAL IN AM    Collection Time: 18  3:59 AM   Result Value Ref Range    WBC 10.6 4.8 - 10.8 K/uL    RBC 3.84 (L) 4.20 - 5.40 M/uL    Hemoglobin 11.6 (L) 12.0 - 16.0 g/dL    Hematocrit 34.0 (L) 37.0 - 47.0 %    MCV 88.5 81.4 - 97.8 fL    MCH 30.2 27.0 - 33.0 pg    MCHC 34.1 33.6 - 35.0 g/dL    RDW 47.2 35.9 - 50.0 fL    Platelet Count 208 164 - 446 K/uL    MPV 12.4 9.0 - 12.9 fL    Neutrophils-Polys 83.80 (H) 44.00 - 72.00 %    Lymphocytes 9.40 (L) 22.00 - 41.00 %    Monocytes 6.10 0.00 - 13.40 %    Eosinophils 0.00 0.00 - 6.90 %     Basophils 0.30 0.00 - 1.80 %    Immature Granulocytes 0.40 0.00 - 0.90 %    Nucleated RBC 0.00 /100 WBC    Neutrophils (Absolute) 8.89 (H) 2.00 - 7.15 K/uL    Lymphs (Absolute) 1.00 1.00 - 4.80 K/uL    Monos (Absolute) 0.65 0.00 - 0.85 K/uL    Eos (Absolute) 0.00 0.00 - 0.51 K/uL    Baso (Absolute) 0.03 0.00 - 0.12 K/uL    Immature Granulocytes (abs) 0.04 0.00 - 0.11 K/uL    NRBC (Absolute) 0.00 K/uL       PLAN:  Continue any current medications.  (See Med List for details.)  Return to clinic  : in 3 month(s)  Continue Anali .

## 2018-10-11 ENCOUNTER — NON-PROVIDER VISIT (OUTPATIENT)
Dept: URGENT CARE | Facility: PHYSICIAN GROUP | Age: 33
End: 2018-10-11

## 2018-10-11 DIAGNOSIS — Z02.1 PRE-EMPLOYMENT DRUG SCREENING: ICD-10-CM

## 2018-10-11 PROCEDURE — 80305 DRUG TEST PRSMV DIR OPT OBS: CPT | Performed by: PHYSICIAN ASSISTANT

## 2018-10-12 LAB
AMP AMPHETAMINE: NORMAL
COC COCAINE: NORMAL
INT CON NEG: NORMAL
INT CON POS: NORMAL
MET METHAMPHETAMINES: NORMAL
OPI OPIATES: NORMAL
PCP PHENCYCLIDINE: NORMAL
POC DRUG COMMENT 753798-OCCUPATIONAL HEALTH: NEGATIVE
THC: NORMAL

## 2019-02-05 ENCOUNTER — GYNECOLOGY VISIT (OUTPATIENT)
Dept: OBGYN | Facility: CLINIC | Age: 34
End: 2019-02-05
Payer: OTHER GOVERNMENT

## 2019-02-05 VITALS
DIASTOLIC BLOOD PRESSURE: 82 MMHG | HEIGHT: 61 IN | SYSTOLIC BLOOD PRESSURE: 160 MMHG | BODY MASS INDEX: 44.56 KG/M2 | WEIGHT: 236 LBS

## 2019-02-05 DIAGNOSIS — N97.0 SECONDARY ANOVULATORY INFERTILITY: ICD-10-CM

## 2019-02-05 PROCEDURE — 99214 OFFICE O/P EST MOD 30 MIN: CPT | Performed by: OBSTETRICS & GYNECOLOGY

## 2019-02-05 NOTE — PROGRESS NOTES
Chief complaint;    Radha Joshi is a 33 y.o.  who presents with secondary infertility.  The patient is status post open myomectomy 2018 for uterine fibroids.  Patient had patent fallopian tubes bilaterally as noted on chromotubation.  At the time of surgery the patient was noted to have a 17-18-week size fundal fibroid which was mostly posterior.  Patient had been treated with Lupron to shrink her fibroids prior to her surgery.  Patient states she is having regular menstrual cycles she does have decreased libido.  Patient stopped taking OCPs she states she did not like the way that made her feel.  She sometimes uses condoms.      Review of systems; denies fever chills abdominal pain, denies chest pain shortness of breath or urinary symptoms  Past medical history-  Past Medical History:   Diagnosis Date   • Asthma    • Pneumonia     Scar tissue remains, activity induced breathing problems, uses a daily and rescue, haven't used for over a year     Past surgical history-  Past Surgical History:   Procedure Laterality Date   • MYOMECTOMY  2018    Procedure: MYOMECTOMY- TRANSABDOMINAL;  Surgeon: Rey Barragan M.D.;  Location: SURGERY SAME DAY [facility];  Service: Gynecology   • PRIMARY C SECTION  2015    Procedure: PRIMARY C SECTION;  Surgeon: Lorena Flores M.D.;  Location: LABOR AND DELIVERY;  Service:    • OTHER      Surgery for Right arm fracture at 18y/o     Allergies-Erythromycin  Medications-  Current Outpatient Prescriptions on File Prior to Visit   Medication Sig Dispense Refill   • drospirenone-ethinyl estradiol (NYA) 3-0.02 MG per tablet Take 1 Tab by mouth every day. 28 Tab 6   • ibuprofen (MOTRIN) 800 MG Tab Take 1 Tab by mouth every 8 hours as needed for Moderate Pain. 30 Tab 3   • ferrous sulfate 325 (65 Fe) MG EC tablet Take 1 Tab by mouth 3 times a day, with meals. 90 Tab 1   • montelukast (SINGULAIR) 10 MG Tab Take 10 mg by mouth  "every day.     • acetaminophen (TYLENOL) 500 MG Tab Take 500-1,000 mg by mouth as needed.     • Melatonin 5 MG Tab Take 2 Tabs by mouth every bedtime.     • Multiple Vitamins-Minerals (EQ MULTIVITAMINS ADULT GUMMY) Chew Tab Take 2 Tabs by mouth every day.     • ferrous sulfate 325 (65 FE) MG tablet Take 1 Tab by mouth 3 times a day, with meals. 90 Tab 3     No current facility-administered medications on file prior to visit.      Social history-  Social History     Social History   • Marital status:      Spouse name: N/A   • Number of children: N/A   • Years of education: N/A     Occupational History   • Not on file.     Social History Main Topics   • Smoking status: Former Smoker     Packs/day: 0.12     Types: Cigarettes     Quit date: 2006   • Smokeless tobacco: Former User     Quit date: 1/1/2002   • Alcohol use Yes      Comment: 2/mo   • Drug use: No   • Sexual activity: Not on file     Other Topics Concern   • Not on file     Social History Narrative   • No narrative on file     Past Family History-no history of breast or ovarian cancer    Physical examination;  Alert and oriented x3  General a thin well-developed well-nourished female in no apparent distress  Vitals:    02/05/19 1045   BP: 160/82   Weight: 107 kg (236 lb)   Height: 1.549 m (5' 1\")     Skin is warm and dry  Neck-supple  HEENT-head-atraumatic, normocephalic, EOMI, PERRLA  Cardiovascular-regular rate and rhythm, normal S1-S2, no murmurs or gallops  Lungs-clear to auscultation bilaterally  Back-negative CVA tenderness  Abdomen-nondistended positive bowel sounds soft nontender no masses or hepatosplenomegaly-Pfannenstiel skin incision healed well.  Pelvic examination;  External genitalia-no visible lesions   Vagina-no blood or discharge  Cervix-no gross lesions  Uterus-normal size and shape, nontender  Adnexa without mass or tenderness  Extremities without cyanosis clubbing or edema  Neurologic exam grossly intact    Impression;  Secondary " infertility-history of uterine fibroids status post open myomectomy  Essential hypertension.  History of uterine fibroids.    Plan;  Patient is counseled to begin try to conceive after May 2019 as per Dr. Barragan's wishes.  Patient has been instructed to follow-up with primary care doctor evaluate elevated blood pressure.    25  Minutes spent with the patient in face-to-face contact, greater than 50% of the time spent on counseling and coordination of care. All questions answered in detail.

## 2021-01-02 ENCOUNTER — HOSPITAL ENCOUNTER (INPATIENT)
Dept: HOSPITAL 8 - LDIP | Age: 36
LOS: 8 days | Discharge: HOME | End: 2021-01-10
Attending: OBSTETRICS & GYNECOLOGY | Admitting: OBSTETRICS & GYNECOLOGY
Payer: OTHER GOVERNMENT

## 2021-01-02 VITALS — HEIGHT: 61 IN | WEIGHT: 287.7 LBS | BODY MASS INDEX: 54.32 KG/M2

## 2021-01-02 DIAGNOSIS — Z3A.37: ICD-10-CM

## 2021-01-02 DIAGNOSIS — E66.9: ICD-10-CM

## 2021-01-02 DIAGNOSIS — Z80.0: ICD-10-CM

## 2021-01-02 DIAGNOSIS — Z80.3: ICD-10-CM

## 2021-01-02 DIAGNOSIS — O34.211: ICD-10-CM

## 2021-01-02 DIAGNOSIS — Z83.3: ICD-10-CM

## 2021-01-02 DIAGNOSIS — Z82.49: ICD-10-CM

## 2021-01-02 DIAGNOSIS — J45.909: ICD-10-CM

## 2021-01-02 DIAGNOSIS — Z88.1: ICD-10-CM

## 2021-01-02 PROCEDURE — 86923 COMPATIBILITY TEST ELECTRIC: CPT

## 2021-01-02 PROCEDURE — 85025 COMPLETE CBC W/AUTO DIFF WBC: CPT

## 2021-01-02 PROCEDURE — 86900 BLOOD TYPING SEROLOGIC ABO: CPT

## 2021-01-02 PROCEDURE — 86850 RBC ANTIBODY SCREEN: CPT

## 2021-01-02 PROCEDURE — 86592 SYPHILIS TEST NON-TREP QUAL: CPT

## 2021-01-02 PROCEDURE — 36415 COLL VENOUS BLD VENIPUNCTURE: CPT

## 2021-01-04 ENCOUNTER — HOSPITAL ENCOUNTER (OUTPATIENT)
Dept: HOSPITAL 8 - STAR | Age: 36
Discharge: HOME | End: 2021-01-04
Attending: OBSTETRICS & GYNECOLOGY
Payer: OTHER GOVERNMENT

## 2021-01-04 ENCOUNTER — HOSPITAL ENCOUNTER (OUTPATIENT)
Facility: MEDICAL CENTER | Age: 36
End: 2021-01-04
Payer: COMMERCIAL

## 2021-01-04 DIAGNOSIS — Z20.828: Primary | ICD-10-CM

## 2021-01-04 LAB — COVID ORDER STATUS COVID19: NORMAL

## 2021-01-04 PROCEDURE — U0003 INFECTIOUS AGENT DETECTION BY NUCLEIC ACID (DNA OR RNA); SEVERE ACUTE RESPIRATORY SYNDROME CORONAVIRUS 2 (SARS-COV-2) (CORONAVIRUS DISEASE [COVID-19]), AMPLIFIED PROBE TECHNIQUE, MAKING USE OF HIGH THROUGHPUT TECHNOLOGIES AS DESCRIBED BY CMS-2020-01-R: HCPCS

## 2021-01-05 LAB
SARS-COV-2 RNA RESP QL NAA+PROBE: NOTDETECTED
SPECIMEN SOURCE: NORMAL

## 2021-01-07 VITALS — SYSTOLIC BLOOD PRESSURE: 120 MMHG | DIASTOLIC BLOOD PRESSURE: 76 MMHG

## 2021-01-07 VITALS — DIASTOLIC BLOOD PRESSURE: 75 MMHG | SYSTOLIC BLOOD PRESSURE: 154 MMHG

## 2021-01-07 VITALS — DIASTOLIC BLOOD PRESSURE: 74 MMHG | SYSTOLIC BLOOD PRESSURE: 116 MMHG

## 2021-01-07 VITALS — SYSTOLIC BLOOD PRESSURE: 122 MMHG | DIASTOLIC BLOOD PRESSURE: 72 MMHG

## 2021-01-07 LAB
BASOPHILS # BLD AUTO: 0 X10^3/UL (ref 0–0.1)
BASOPHILS # BLD AUTO: 0.1 X10^3/UL (ref 0–0.1)
BASOPHILS NFR BLD AUTO: 0 % (ref 0–1)
BASOPHILS NFR BLD AUTO: 1 % (ref 0–1)
EOSINOPHIL # BLD AUTO: 0 X10^3/UL (ref 0–0.4)
EOSINOPHIL # BLD AUTO: 0.1 X10^3/UL (ref 0–0.4)
EOSINOPHIL NFR BLD AUTO: 0 % (ref 1–7)
EOSINOPHIL NFR BLD AUTO: 1 % (ref 1–7)
ERYTHROCYTE [DISTWIDTH] IN BLOOD BY AUTOMATED COUNT: 15.6 % (ref 9.6–15.2)
ERYTHROCYTE [DISTWIDTH] IN BLOOD BY AUTOMATED COUNT: 15.7 % (ref 9.6–15.2)
LYMPHOCYTES # BLD AUTO: 1.4 X10^3/UL (ref 1–3.4)
LYMPHOCYTES # BLD AUTO: 1.5 X10^3/UL (ref 1–3.4)
LYMPHOCYTES NFR BLD AUTO: 14 % (ref 22–44)
LYMPHOCYTES NFR BLD AUTO: 19 % (ref 22–44)
MCH RBC QN AUTO: 29.3 PG (ref 27–34.8)
MCH RBC QN AUTO: 29.3 PG (ref 27–34.8)
MCHC RBC AUTO-ENTMCNC: 33.4 G/DL (ref 32.4–35.8)
MCHC RBC AUTO-ENTMCNC: 33.9 G/DL (ref 32.4–35.8)
MD: NO
MD: NO
MONOCYTES # BLD AUTO: 0.5 X10^3/UL (ref 0.2–0.8)
MONOCYTES # BLD AUTO: 0.5 X10^3/UL (ref 0.2–0.8)
MONOCYTES NFR BLD AUTO: 5 % (ref 2–9)
MONOCYTES NFR BLD AUTO: 6 % (ref 2–9)
NEUTROPHILS # BLD AUTO: 5.7 X10^3/UL (ref 1.8–6.8)
NEUTROPHILS # BLD AUTO: 8.1 X10^3/UL (ref 1.8–6.8)
NEUTROPHILS NFR BLD AUTO: 74 % (ref 42–75)
NEUTROPHILS NFR BLD AUTO: 80 % (ref 42–75)
PLATELET # BLD AUTO: 226 X10^3/UL (ref 130–400)
PLATELET # BLD AUTO: 269 X10^3/UL (ref 130–400)
PMV BLD AUTO: 9.6 FL (ref 7.4–10.4)
PMV BLD AUTO: 9.7 FL (ref 7.4–10.4)
RBC # BLD AUTO: 3.96 X10^6/UL (ref 3.82–5.3)
RBC # BLD AUTO: 4.33 X10^6/UL (ref 3.82–5.3)

## 2021-01-07 RX ADMIN — Medication SCH MLS/HR: at 10:17

## 2021-01-07 RX ADMIN — ACETAMINOPHEN PRN MG: 325 TABLET, FILM COATED ORAL at 18:36

## 2021-01-07 RX ADMIN — SODIUM CHLORIDE, SODIUM LACTATE, POTASSIUM CHLORIDE, AND CALCIUM CHLORIDE SCH MLS/HR: .6; .31; .03; .02 INJECTION, SOLUTION INTRAVENOUS at 09:00

## 2021-01-07 RX ADMIN — ACETAMINOPHEN PRN MG: 325 TABLET, FILM COATED ORAL at 14:37

## 2021-01-07 RX ADMIN — OXYCODONE HYDROCHLORIDE PRN MG: 5 TABLET ORAL at 14:39

## 2021-01-07 RX ADMIN — SODIUM CHLORIDE, SODIUM LACTATE, POTASSIUM CHLORIDE, AND CALCIUM CHLORIDE SCH MLS/HR: .6; .31; .03; .02 INJECTION, SOLUTION INTRAVENOUS at 17:00

## 2021-01-07 RX ADMIN — KETOROLAC TROMETHAMINE SCH MG: 30 INJECTION, SOLUTION INTRAMUSCULAR at 09:00

## 2021-01-07 RX ADMIN — SIMETHICONE PRN MG: 80 TABLET, CHEWABLE ORAL at 12:01

## 2021-01-07 RX ADMIN — PRENATAL VIT W/ FE FUMARATE-FA TAB 27-0.8 MG SCH EACH: 27-0.8 TAB at 09:00

## 2021-01-07 RX ADMIN — KETOROLAC TROMETHAMINE SCH MG: 30 INJECTION, SOLUTION INTRAMUSCULAR at 14:37

## 2021-01-07 RX ADMIN — Medication SCH MLS/HR: at 19:00

## 2021-01-07 RX ADMIN — OXYCODONE HYDROCHLORIDE PRN MG: 5 TABLET ORAL at 18:37

## 2021-01-07 RX ADMIN — KETOROLAC TROMETHAMINE SCH MG: 30 INJECTION, SOLUTION INTRAMUSCULAR at 21:34

## 2021-01-07 RX ADMIN — SIMETHICONE PRN MG: 80 TABLET, CHEWABLE ORAL at 18:36

## 2021-01-08 VITALS — SYSTOLIC BLOOD PRESSURE: 118 MMHG | DIASTOLIC BLOOD PRESSURE: 72 MMHG

## 2021-01-08 VITALS — DIASTOLIC BLOOD PRESSURE: 88 MMHG | SYSTOLIC BLOOD PRESSURE: 146 MMHG

## 2021-01-08 VITALS — SYSTOLIC BLOOD PRESSURE: 124 MMHG | DIASTOLIC BLOOD PRESSURE: 70 MMHG

## 2021-01-08 VITALS — SYSTOLIC BLOOD PRESSURE: 118 MMHG | DIASTOLIC BLOOD PRESSURE: 77 MMHG

## 2021-01-08 RX ADMIN — ACETAMINOPHEN PRN MG: 325 TABLET, FILM COATED ORAL at 05:18

## 2021-01-08 RX ADMIN — OXYCODONE HYDROCHLORIDE PRN MG: 5 TABLET ORAL at 19:04

## 2021-01-08 RX ADMIN — KETOROLAC TROMETHAMINE SCH MG: 30 INJECTION, SOLUTION INTRAMUSCULAR at 22:03

## 2021-01-08 RX ADMIN — KETOROLAC TROMETHAMINE SCH MG: 30 INJECTION, SOLUTION INTRAMUSCULAR at 03:28

## 2021-01-08 RX ADMIN — ACETAMINOPHEN PRN MG: 325 TABLET, FILM COATED ORAL at 14:00

## 2021-01-08 RX ADMIN — OXYCODONE HYDROCHLORIDE PRN MG: 5 TABLET ORAL at 14:01

## 2021-01-08 RX ADMIN — ACETAMINOPHEN PRN MG: 325 TABLET, FILM COATED ORAL at 23:21

## 2021-01-08 RX ADMIN — OXYCODONE HYDROCHLORIDE PRN MG: 5 TABLET ORAL at 05:19

## 2021-01-08 RX ADMIN — ACETAMINOPHEN PRN MG: 325 TABLET, FILM COATED ORAL at 19:03

## 2021-01-08 RX ADMIN — OXYCODONE HYDROCHLORIDE PRN MG: 5 TABLET ORAL at 00:16

## 2021-01-08 RX ADMIN — SODIUM CHLORIDE, SODIUM LACTATE, POTASSIUM CHLORIDE, AND CALCIUM CHLORIDE SCH MLS/HR: .6; .31; .03; .02 INJECTION, SOLUTION INTRAVENOUS at 09:00

## 2021-01-08 RX ADMIN — Medication SCH MLS/HR: at 05:00

## 2021-01-08 RX ADMIN — KETOROLAC TROMETHAMINE SCH MG: 30 INJECTION, SOLUTION INTRAMUSCULAR at 16:18

## 2021-01-08 RX ADMIN — KETOROLAC TROMETHAMINE SCH MG: 30 INJECTION, SOLUTION INTRAMUSCULAR at 09:27

## 2021-01-08 RX ADMIN — OXYCODONE HYDROCHLORIDE PRN MG: 5 TABLET ORAL at 09:28

## 2021-01-08 RX ADMIN — DOCUSATE SODIUM PRN MG: 100 CAPSULE, LIQUID FILLED ORAL at 09:29

## 2021-01-08 RX ADMIN — PRENATAL VIT W/ FE FUMARATE-FA TAB 27-0.8 MG SCH EACH: 27-0.8 TAB at 09:28

## 2021-01-08 RX ADMIN — OXYCODONE HYDROCHLORIDE PRN MG: 5 TABLET ORAL at 23:22

## 2021-01-08 RX ADMIN — ACETAMINOPHEN PRN MG: 325 TABLET, FILM COATED ORAL at 09:26

## 2021-01-08 RX ADMIN — Medication SCH MLS/HR: at 15:00

## 2021-01-08 RX ADMIN — ACETAMINOPHEN PRN MG: 325 TABLET, FILM COATED ORAL at 00:15

## 2021-01-08 RX ADMIN — DOCUSATE SODIUM PRN MG: 100 CAPSULE, LIQUID FILLED ORAL at 22:04

## 2021-01-08 RX ADMIN — SIMETHICONE PRN MG: 80 TABLET, CHEWABLE ORAL at 19:03

## 2021-01-08 RX ADMIN — SODIUM CHLORIDE, SODIUM LACTATE, POTASSIUM CHLORIDE, AND CALCIUM CHLORIDE SCH MLS/HR: .6; .31; .03; .02 INJECTION, SOLUTION INTRAVENOUS at 17:00

## 2021-01-08 RX ADMIN — SIMETHICONE PRN MG: 80 TABLET, CHEWABLE ORAL at 09:26

## 2021-01-08 RX ADMIN — SODIUM CHLORIDE, SODIUM LACTATE, POTASSIUM CHLORIDE, AND CALCIUM CHLORIDE SCH MLS/HR: .6; .31; .03; .02 INJECTION, SOLUTION INTRAVENOUS at 01:00

## 2021-01-09 VITALS — SYSTOLIC BLOOD PRESSURE: 140 MMHG | DIASTOLIC BLOOD PRESSURE: 84 MMHG

## 2021-01-09 VITALS — SYSTOLIC BLOOD PRESSURE: 156 MMHG | DIASTOLIC BLOOD PRESSURE: 101 MMHG

## 2021-01-09 VITALS — SYSTOLIC BLOOD PRESSURE: 135 MMHG | DIASTOLIC BLOOD PRESSURE: 82 MMHG

## 2021-01-09 RX ADMIN — OXYCODONE HYDROCHLORIDE PRN MG: 5 TABLET ORAL at 04:19

## 2021-01-09 RX ADMIN — Medication SCH MLS/HR: at 00:25

## 2021-01-09 RX ADMIN — OXYCODONE HYDROCHLORIDE PRN MG: 5 TABLET ORAL at 16:09

## 2021-01-09 RX ADMIN — IBUPROFEN PRN MG: 600 TABLET ORAL at 16:09

## 2021-01-09 RX ADMIN — SODIUM CHLORIDE, SODIUM LACTATE, POTASSIUM CHLORIDE, AND CALCIUM CHLORIDE SCH MLS/HR: .6; .31; .03; .02 INJECTION, SOLUTION INTRAVENOUS at 09:00

## 2021-01-09 RX ADMIN — IBUPROFEN PRN MG: 600 TABLET ORAL at 22:37

## 2021-01-09 RX ADMIN — ACETAMINOPHEN PRN MG: 325 TABLET, FILM COATED ORAL at 04:19

## 2021-01-09 RX ADMIN — DOCUSATE SODIUM PRN MG: 100 CAPSULE, LIQUID FILLED ORAL at 07:43

## 2021-01-09 RX ADMIN — OXYCODONE HYDROCHLORIDE PRN MG: 5 TABLET ORAL at 12:05

## 2021-01-09 RX ADMIN — OXYCODONE HYDROCHLORIDE PRN MG: 5 TABLET ORAL at 20:11

## 2021-01-09 RX ADMIN — PRENATAL VIT W/ FE FUMARATE-FA TAB 27-0.8 MG SCH EACH: 27-0.8 TAB at 07:43

## 2021-01-09 RX ADMIN — DOCUSATE SODIUM PRN MG: 100 CAPSULE, LIQUID FILLED ORAL at 20:11

## 2021-01-09 RX ADMIN — ACETAMINOPHEN PRN MG: 325 TABLET, FILM COATED ORAL at 20:16

## 2021-01-09 RX ADMIN — SODIUM CHLORIDE, SODIUM LACTATE, POTASSIUM CHLORIDE, AND CALCIUM CHLORIDE SCH MLS/HR: .6; .31; .03; .02 INJECTION, SOLUTION INTRAVENOUS at 00:25

## 2021-01-09 RX ADMIN — OXYCODONE HYDROCHLORIDE PRN MG: 5 TABLET ORAL at 07:43

## 2021-01-09 RX ADMIN — KETOROLAC TROMETHAMINE SCH MG: 30 INJECTION, SOLUTION INTRAMUSCULAR at 04:19

## 2021-01-09 RX ADMIN — IBUPROFEN PRN MG: 600 TABLET ORAL at 10:27

## 2021-01-10 VITALS — DIASTOLIC BLOOD PRESSURE: 82 MMHG | SYSTOLIC BLOOD PRESSURE: 129 MMHG

## 2021-01-10 VITALS — DIASTOLIC BLOOD PRESSURE: 84 MMHG | SYSTOLIC BLOOD PRESSURE: 127 MMHG

## 2021-01-10 RX ADMIN — OXYCODONE HYDROCHLORIDE PRN MG: 5 TABLET ORAL at 12:54

## 2021-01-10 RX ADMIN — ACETAMINOPHEN PRN MG: 325 TABLET, FILM COATED ORAL at 12:54

## 2021-01-10 RX ADMIN — IBUPROFEN PRN MG: 600 TABLET ORAL at 12:54

## 2021-01-10 RX ADMIN — IBUPROFEN PRN MG: 600 TABLET ORAL at 04:57

## 2021-01-10 RX ADMIN — ACETAMINOPHEN PRN MG: 325 TABLET, FILM COATED ORAL at 00:24

## 2021-01-10 RX ADMIN — ACETAMINOPHEN PRN MG: 325 TABLET, FILM COATED ORAL at 08:45

## 2021-01-10 RX ADMIN — PRENATAL VIT W/ FE FUMARATE-FA TAB 27-0.8 MG SCH EACH: 27-0.8 TAB at 08:45

## 2021-01-10 RX ADMIN — OXYCODONE HYDROCHLORIDE PRN MG: 5 TABLET ORAL at 00:24

## 2021-01-10 RX ADMIN — DOCUSATE SODIUM PRN MG: 100 CAPSULE, LIQUID FILLED ORAL at 08:45

## 2021-01-10 RX ADMIN — OXYCODONE HYDROCHLORIDE PRN MG: 5 TABLET ORAL at 08:45

## 2021-01-10 RX ADMIN — OXYCODONE HYDROCHLORIDE PRN MG: 5 TABLET ORAL at 04:57

## 2021-01-10 RX ADMIN — ACETAMINOPHEN PRN MG: 325 TABLET, FILM COATED ORAL at 04:57

## (undated) DEVICE — SPONGE GAUZESTER 4 X 4 4PLY - (128PK/CA)

## (undated) DEVICE — CLOSURE SKIN STRIP 1/2 X 4 IN - (STERI STRIP) (50/BX 4BX/CA)

## (undated) DEVICE — TRAY SRGPRP PVP IOD WT PRP - (20/CA)

## (undated) DEVICE — ELECTRODE DUAL RETURN W/ CORD - (50/PK)

## (undated) DEVICE — BOVIE NEEDLE TIP INSULATD NON-SAFETY 2CM (50/PK)

## (undated) DEVICE — DRESSING NON-ADHERING 8 X 3 - (50/BX)

## (undated) DEVICE — SENSOR SPO2 NEO LNCS ADHESIVE (20/BX) SEE USER NOTES

## (undated) DEVICE — SET LEADWIRE 5 LEAD BEDSIDE DISPOSABLE ECG (1SET OF 5/EA)

## (undated) DEVICE — HEAD HOLDER JUNIOR/ADULT

## (undated) DEVICE — DRESSING INTERCEED ABSORBABLE ADHESION BARRIER TC7 (10EA/CA)

## (undated) DEVICE — PAD LAP STERILE 18 X 18 - (5/PK 40PK/CA)

## (undated) DEVICE — STAPLER SKIN DISP - (6/BX 10BX/CA) VISISTAT

## (undated) DEVICE — SYRINGE 10 ML CONTROL LL (25EA/BX 4BX/CA)

## (undated) DEVICE — PROTECTOR ULNA NERVE - (36PR/CA)

## (undated) DEVICE — SUTURE GENERAL

## (undated) DEVICE — NEEDLE NON SAFETY HYPO 22 GA X 1 1/2 IN (100/BX)

## (undated) DEVICE — BOVIE  BLADE 6 EXTENDED - (50/PK)

## (undated) DEVICE — PACK MAJOR BASIN - (2EA/CA)

## (undated) DEVICE — SUCTION INSTRUMENT YANKAUER BULBOUS TIP W/O VENT (50EA/CA)

## (undated) DEVICE — SET EXTENSION 31IN APPX 3.2ML WITH CLAMP (50/CA)WAS 4610-03

## (undated) DEVICE — DRAPE LARGE 3 QUARTER - (20/CA)

## (undated) DEVICE — TRAY FOLEY CATHETER STATLOCK 16FR SURESTEP  (10EA/CA)

## (undated) DEVICE — CATHETER IV 20 GA X 1-1/4 ---SURG.& SDS ONLY--- (50EA/BX)

## (undated) DEVICE — TOWELS CLOTH SURGICAL - (4/PK 20PK/CA)

## (undated) DEVICE — TUBE E-T HI-LO CUFF 8.0MM (10EA/PK)

## (undated) DEVICE — DRESSING TRANSPARENT FILM TEGADERM 4 X 4.75" (50EA/BX)"

## (undated) DEVICE — GLOVE BIOGEL SZ 6 PF LATEX - (50EA/BX 4BX/CA)

## (undated) DEVICE — UTERINE MANIP RUMI 6.7X8 - (5/BX)

## (undated) DEVICE — SODIUM CHL IRRIGATION 0.9% 1000ML (12EA/CA)

## (undated) DEVICE — SUTURE 0 VICRYL PLUS CT-1 - 8 X 18 INCH (12/BX)

## (undated) DEVICE — NEPTUNE 4 PORT MANIFOLD - (20/PK)

## (undated) DEVICE — SHEET TRANSVERSE LAP - (12EA/CA)

## (undated) DEVICE — ELECTRODE 850 FOAM ADHESIVE - HYDROGEL RADIOTRNSPRNT (50/PK)

## (undated) DEVICE — CANISTER SUCTION RIGID RED 1500CC (40EA/CA)

## (undated) DEVICE — SWABSTICK BENZOIN SINGLE (50EA/BX)

## (undated) DEVICE — GOWN SURGEONS X-LARGE - DISP. (30/CA)

## (undated) DEVICE — KIT  I.V. START (100EA/CA)

## (undated) DEVICE — GLOVE BIOGEL SZ 8.5 SURGICAL PF LTX - (50PR/BX 4BX/CA)

## (undated) DEVICE — KIT ANESTHESIA W/CIRCUIT & 3/LT BAG W/FILTER (20EA/CA)

## (undated) DEVICE — MASK ANESTHESIA ADULT  - (100/CA)

## (undated) DEVICE — DRAPE MAYO STAND - (30/CA)

## (undated) DEVICE — SET EXTENSION WITH 2 PORTS (48EA/CA) ***PART #2C8610 IS A SUBSTITUTE*****

## (undated) DEVICE — SPONGE GAUZE STER 4X4 8-PL - (2/PK 50PK/BX 12BX/CS)

## (undated) DEVICE — CANISTER SUCTION 3000ML MECHANICAL FILTER AUTO SHUTOFF MEDI-VAC NONSTERILE LF DISP  (40EA/CA)

## (undated) DEVICE — TUBING CLEARLINK DUO-VENT - C-FLO (48EA/CA)

## (undated) DEVICE — WATER IRRIGATION STERILE 1000ML (12EA/CA)

## (undated) DEVICE — LACTATED RINGERS INJ 1000 ML - (14EA/CA 60CA/PF)

## (undated) DEVICE — MEDICINE CUP STERILE 2 OZ - (100/CA)

## (undated) DEVICE — TUBE CONNECTING SUCTION - CLEAR PLASTIC STERILE 72 IN (50EA/CA)

## (undated) DEVICE — TRAY BLADDER CARE W/ 16 FR FOLEY CATHETER STATLOCK  (10/CA)

## (undated) DEVICE — CHLORAPREP 26 ML APPLICATOR - ORANGE TINT(25/CA)

## (undated) DEVICE — BAG DECANTER (50EA/CS)

## (undated) DEVICE — GLOVE BIOGEL INDICATOR SZ 8.5 SURGICAL PF LTX - (50/BX 4BX/CA)